# Patient Record
Sex: FEMALE | Race: WHITE | NOT HISPANIC OR LATINO | Employment: OTHER | URBAN - METROPOLITAN AREA
[De-identification: names, ages, dates, MRNs, and addresses within clinical notes are randomized per-mention and may not be internally consistent; named-entity substitution may affect disease eponyms.]

---

## 2017-12-13 ENCOUNTER — OFFICE VISIT (OUTPATIENT)
Dept: CARDIOLOGY | Facility: CLINIC | Age: 42
End: 2017-12-13

## 2017-12-13 VITALS
SYSTOLIC BLOOD PRESSURE: 128 MMHG | DIASTOLIC BLOOD PRESSURE: 74 MMHG | HEIGHT: 64 IN | BODY MASS INDEX: 30.66 KG/M2 | WEIGHT: 179.6 LBS | HEART RATE: 58 BPM

## 2017-12-13 DIAGNOSIS — I10 ESSENTIAL HYPERTENSION: ICD-10-CM

## 2017-12-13 DIAGNOSIS — R00.0 INAPPROPRIATE SINUS TACHYCARDIA: Primary | ICD-10-CM

## 2017-12-13 DIAGNOSIS — I25.10 CORONARY ARTERY DISEASE INVOLVING NATIVE CORONARY ARTERY OF NATIVE HEART WITHOUT ANGINA PECTORIS: ICD-10-CM

## 2017-12-13 PROCEDURE — 99213 OFFICE O/P EST LOW 20 MIN: CPT | Performed by: INTERNAL MEDICINE

## 2017-12-13 RX ORDER — HYDROCODONE BITARTRATE AND ACETAMINOPHEN 7.5; 325 MG/1; MG/1
1 TABLET ORAL AS NEEDED
COMMUNITY
End: 2019-01-17

## 2017-12-13 RX ORDER — LEVALBUTEROL TARTRATE 45 UG/1
1-2 AEROSOL, METERED ORAL AS NEEDED
COMMUNITY
End: 2022-04-29 | Stop reason: ALTCHOICE

## 2017-12-13 RX ORDER — SODIUM CHLORIDE FOR INHALATION 0.9 %
3 VIAL, NEBULIZER (ML) INHALATION AS NEEDED
COMMUNITY
End: 2019-03-05

## 2017-12-13 RX ORDER — ESCITALOPRAM OXALATE 10 MG/1
10 TABLET ORAL DAILY
Status: ON HOLD | COMMUNITY
End: 2018-11-12 | Stop reason: ALTCHOICE

## 2017-12-13 RX ORDER — DIPHENHYDRAMINE HCL 25 MG
25 CAPSULE ORAL 2 TIMES DAILY
COMMUNITY
End: 2019-06-20 | Stop reason: DRUGHIGH

## 2017-12-13 NOTE — PROGRESS NOTES
"Petra Tai  1975  [phone]      12/13/2017    [facility] CARDIOLOGY     Morris Brito MD  [address] [address]    No chief complaint on file.      PROBLEM LIST:  1. Inappropriate sinus tachycardia x7 years:  a. Developed during pregnancy 7 years ago.  b. Multiple Holter monitors and event recordings showing only arrhythmia to be sinus tachycardia.  c. Echocardiogram, August 2002, which showed low normal LV function with an estimated EF of 45% to 50%.  d. MUGA scan, September 2002, showed normal wall motion with an EF of 57%.  e. EP study, 04/12/2004, Dr. Lehman: RFA of inappropriate sinus tachycardia with partial sinus node re-modification.  f. Status post ThermoCool radiofrequency ablation for inappropriate sinus tachycardia on 11/20/2004.  g. Event recorder and echocardiogram with Dr. Brito: Normal per patient, no data.  h. Event recorder, January 2012, with heart rate . Questionable nonsustained atrial tachycardia versus exit block.   i. Nuclear GXT, 07/25/2012: Normal study. EF 67%  j. Echocardiogram 9/28/2016: EF 63%.  Trace MR noted.  Technically limited study.  2. Myocardial infarction.  a. Apparent hospitalization in April 2013 in [place] with a heart catheterization demonstrating 80% to 90% stenosis of a coronary artery too small to stent.   b. Positive troponins, database incomplete.   3. Hypertension x7 years.  4. Hyperlipidemia.  5. Obesity.  6. History of tobacco abuse/chronic obstructive pulmonary disease with cessation 3 months ago.    Allergies  Allergies   Allergen Reactions   • Aspirin Hives     Patient says \"baby aspirin is fine but none of the other mgs\"   • Z-Wolf [Azithromycin]        Current Medications    Current Outpatient Prescriptions:   •  atorvastatin (LIPITOR) 40 MG tablet, Take 40 mg by mouth daily., Disp: , Rfl:   •  clonazePAM (KlonoPIN) 1 MG tablet, Take 1 mg by mouth as needed for seizures., Disp: , Rfl:   •  diltiazem CD " (CARDIZEM CD) 180 MG 24 hr capsule, Take 180 mg by mouth 3 (three) times a day., Disp: , Rfl:   •  diphenhydrAMINE (BENADRYL) 25 mg capsule, Take 25 mg by mouth 2 (Two) Times a Day., Disp: , Rfl:   •  escitalopram (LEXAPRO) 10 MG tablet, Take 10 mg by mouth Daily., Disp: , Rfl:   •  furosemide (LASIX) 40 MG tablet, Take 40 mg by mouth daily., Disp: , Rfl:   •  HYDROcodone-acetaminophen (NORCO) 7.5-325 MG per tablet, Take 1 tablet by mouth 2 (Two) Times a Day., Disp: , Rfl:   •  levalbuterol (XOPENEX HFA) 45 MCG/ACT inhaler, Inhale 1-2 puffs Every 4 (Four) Hours As Needed for Wheezing., Disp: , Rfl:   •  Meloxicam 10 MG capsule, Take 10 mg by mouth daily., Disp: , Rfl:   •  Metoprolol Succinate (TOPROL XL PO), Take 200 mg by mouth 2 (two) times a day., Disp: , Rfl:   •  pantoprazole (PROTONIX) 40 MG EC tablet, Take 40 mg by mouth 2 (two) times a day., Disp: , Rfl:   •  Ped Multivitamins-Fl-Iron (MULTIVITAMIN WITH FLUORIDE/IRON) 0.25-10 MG/ML solution solution, Take  by mouth Daily., Disp: , Rfl:   •  Potassium (POTASSIMIN PO), Take  by mouth daily., Disp: , Rfl:   •  sodium chloride 0.9 % nebulizer solution, Take 3 mL by nebulization As Needed for Wheezing., Disp: , Rfl:     History of Present Illness   HPI    Pt presents for follow up of IAST. Notes some episodes of tachycardia, a few times a week.  Sometimes only lasts a few minutes, sometimes an hour.  Can be at rest or with activity.  Some dizziness with her episodes.  Since the pt has seen us, pt denies any palpitations, SOB, CP, LH, and dizziness. Denies any hospitalizations, ER visits, or TIA/CVA symptoms.  She had gastric bypass surgery 1 year ago and has lost over 100lbs since then.  Overall feels well. No side effects to medications.  Blood pressures well controlled at home.    ROS:  General:  Denies fatigue, weight gain or loss  Cardiovascular:  + palpitations, denies CP, PND, syncope, near syncope, edema.  Pulmonary:  Denies MEDINA, cough, or  "wheezing    Vitals:    12/13/17 1320   BP: 128/74   BP Location: Left arm   Patient Position: Sitting   Pulse: 58   Weight: 81.5 kg (179 lb 9.6 oz)   Height: 162.6 cm (64\")       PE:  GEN: Well nourished, Well- developed  No acute distress  NECK: No adenopathy, trachea midline, neck supple, NO JVD, no thyromegaly  CARD: Regular rhythm and normal rate, S1 S2, no murmur, gallop, rub, or click  LUNGS: Clear to auscultation bilaterally, normal respiratory effort, no wheezes, rhonchi or rales.  EXTREMITIES:No gross deformities,  No clubbing, cyanosis, or edema      Diagnostic Data:  Procedures.    1. Inappropriate sinus tachycardia    2. Essential hypertension    3. Coronary artery disease involving native coronary artery of native heart without angina pectoris          Plan:  1) IAST:  - Some episodes of tachycardia but overall controlled on current medications  2) HTN  - Well controlled on current medications  - Wt loss, exercise, salt reduction  3) Coronary artery disease:  - Stable  - ECHO 9/2016 EF 63%    F/up in 12 months    Scribed for Michael Fong MD by Lito Lawson. 12/13/2017  1:46 PM     Scribed for Michael Fong MD by Rossana Crump, WOOD. 12/13/2017  1:46 PM    IMichael MD, personally performed the services described in this documentation as scribed by the above named individual in my presence, and it is both accurate and complete.  12/13/2017  1:57 PM      "

## 2018-07-20 ENCOUNTER — TELEPHONE (OUTPATIENT)
Dept: CARDIOLOGY | Facility: CLINIC | Age: 43
End: 2018-07-20

## 2018-07-27 ENCOUNTER — TELEPHONE (OUTPATIENT)
Dept: CARDIOLOGY | Facility: CLINIC | Age: 43
End: 2018-07-27

## 2018-07-27 NOTE — TELEPHONE ENCOUNTER
Patient called back about medical clearance paperwork. Awaiting patient to call back for facilities fax #.

## 2018-10-02 ENCOUNTER — OFFICE VISIT (OUTPATIENT)
Dept: CARDIOLOGY | Facility: CLINIC | Age: 43
End: 2018-10-02

## 2018-10-02 VITALS
HEIGHT: 64 IN | BODY MASS INDEX: 30.08 KG/M2 | HEART RATE: 57 BPM | DIASTOLIC BLOOD PRESSURE: 68 MMHG | SYSTOLIC BLOOD PRESSURE: 124 MMHG | WEIGHT: 176.2 LBS

## 2018-10-02 DIAGNOSIS — R07.89 OTHER CHEST PAIN: ICD-10-CM

## 2018-10-02 DIAGNOSIS — R55 NEAR SYNCOPE: ICD-10-CM

## 2018-10-02 DIAGNOSIS — R00.2 PALPITATIONS: ICD-10-CM

## 2018-10-02 DIAGNOSIS — I10 ESSENTIAL HYPERTENSION: Primary | ICD-10-CM

## 2018-10-02 PROCEDURE — 99214 OFFICE O/P EST MOD 30 MIN: CPT | Performed by: PHYSICIAN ASSISTANT

## 2018-10-02 PROCEDURE — 93000 ELECTROCARDIOGRAM COMPLETE: CPT | Performed by: PHYSICIAN ASSISTANT

## 2018-10-02 RX ORDER — FOLIC ACID 1 MG/1
1 TABLET ORAL DAILY
COMMUNITY
End: 2019-03-05

## 2018-10-02 RX ORDER — NITROGLYCERIN 0.4 MG/1
0.4 TABLET SUBLINGUAL AS NEEDED
COMMUNITY

## 2018-10-02 RX ORDER — ALBUTEROL SULFATE 2.5 MG/3ML
2.5 SOLUTION RESPIRATORY (INHALATION) AS NEEDED
COMMUNITY

## 2018-10-02 RX ORDER — CYANOCOBALAMIN/FOLIC ACID 1MG-400MCG
TABLET, SUBLINGUAL SUBLINGUAL
Status: ON HOLD | COMMUNITY
End: 2018-11-12 | Stop reason: ALTCHOICE

## 2018-10-02 RX ORDER — LOSARTAN POTASSIUM 50 MG/1
50 TABLET ORAL DAILY
COMMUNITY
End: 2021-06-29

## 2018-10-02 NOTE — PROGRESS NOTES
[facility]   OFFICE NOTE      Petra Tai  1975  PCP: Morris Brito MD    SUBJECTIVE:   Petra Tai is a 42 y.o. female seen for a follow up visit regarding the following: Dizziness, IST, HTN, and Palpitations     CC:IST    PROBLEM LIST:  1. Inappropriate sinus tachycardia:   a. Developed during pregnancy 7 years ago.  b. Multiple Holter monitors and event recordings showing only arrhythmia to be sinus tachycardia.  c. Echocardiogram, August 2002, which showed low normal LV function with an estimated EF of 45% to 50%.  d. MUGA scan, September 2002, showed normal wall motion with an EF of 57%.  e. EP study, 04/12/2004, Dr. Lehman: RFA of inappropriate sinus tachycardia with partial sinus node re-modification.  f. Status post ThermoCool radiofrequency ablation for inappropriate sinus tachycardia on 11/20/2004.  g. Event recorder and echocardiogram with Dr. Brito: Normal per patient, no data.  h. Event recorder, January 2012, with heart rate . Questionable nonsustained atrial tachycardia versus exit block.   i. Nuclear GXT, 07/25/2012: Normal study. EF 67%  j. Echocardiogram 9/28/2016: EF 63%.  Trace MR noted.  Technically limited study.  2. Myocardial infarction.  a. Apparent hospitalization in April 2013 in [place] with a heart catheterization demonstrating 80% to 90% stenosis of a coronary artery too small to stent.   b. Positive troponins, database incomplete.   3. Hypertension x7 years.  4. Hyperlipidemia.  5. Obesity.  6. + Tobacco abuse since age 17  7. Gastric sleeve 12/16 in [place], [place] (-150 pounds)      HPI:   Today I saw Mrs. Tai for follow-up regarding history of new onset chest pain, palpitations, near-syncope, hypertension and history of inappropriate sinus tachycardia.  Mrs. Tai is a pleasant 42-year-old female who's had multiple remote ablations initially with Dr. Lehman in 2004 and a repeat procedure a few months later.  She has been  "on high-dose beta blockers and calcium channel blockers to control her heart rate and palpitations.  She last had a stress test in 2012 that was normal and echocardiogram about 2 years ago that revealed normal LV function.  She reports that approximately 6 years ago she had a heart catheter revealing small vessel coronary disease after an episode of chest pain.  She reports that in the past few weeks she's had episodes of chest pain.  Her initial episode occurred when she was driving home from [employer] she had onset of chest pressure associated with some dizziness diaphoresis and some mild nausea.  The symptoms lasted a few minutes resolve on their own.  She's had 2 more episodes but these occurred while standing on her feet one while she was folding laundry another episode when she was cleaning her house.  She describes as feeling of chest pressure in the central region of her chest that she feels cool and clammy feeling and she becomes sweaty and feels that she may pass out.  She will sit down and rest and the symptoms resolve.   She denies it radiates into her neck or into her back.  She also denies any vomiting or valencia syncope events.   She reports her blood pressure has been \"okay\".  She presents her primary care provider regarding his symptoms is recommended she have further cardiac evaluation with our office.  She denies he sustained palpitations but does have occasional breakthrough episodes on her current medications.    ROS:  Review of Symptoms:  General: no recent weight loss/gain, weakness or fatigue  Skin: no rashes, lumps, or other skin changes  HEENT: + dizziness,+ lightheadedness, No vision changes  Respiratory: no cough or hemoptysis  Cardiovascular: + palpitations, and tachycardia  Gastrointestinal: no black/tarry stools or diarrhea  Urinary: no change in frequency or urgency  Peripheral Vascular: no claudication or leg cramps  Musculoskeletal: no muscle or joint pain/stiffness  Psychiatric: no " depression or excessive stress  Neurological: no sensory or motor loss, no syncope  Hematologic: no anemia, easy bruising or bleeding  Endocrine: no thyroid problems, nor heat or cold intolerance    Cardiac PMH: (Old records have been reviewed and summarized below)      Past Medical History, Past Surgical History, Family history, Social History, and Medications were all reviewed with the patient today and updated as necessary.       Current Outpatient Prescriptions:   •  albuterol (PROVENTIL) (2.5 MG/3ML) 0.083% nebulizer solution, Take 2.5 mg by nebulization As Needed for Wheezing., Disp: , Rfl:   •  clonazePAM (KlonoPIN) 1 MG tablet, Take 1 mg by mouth as needed for seizures., Disp: , Rfl:   •  Cobalamine Combinations (B-12) 1000-400 MCG sublingual tablet, Place  under the tongue Every 30 (Thirty) Days., Disp: , Rfl:   •  diltiazem CD (CARDIZEM CD) 180 MG 24 hr capsule, Take 180 mg by mouth 3 (three) times a day., Disp: , Rfl:   •  diphenhydrAMINE (BENADRYL) 25 mg capsule, Take 25 mg by mouth As Needed., Disp: , Rfl:   •  Ergocalciferol (VITAMIN D2 PO), Take 50,000 Units by mouth 1 (One) Time Per Week., Disp: , Rfl:   •  escitalopram (LEXAPRO) 10 MG tablet, Take 10 mg by mouth Daily., Disp: , Rfl:   •  folic acid (FOLVITE) 1 MG tablet, Take 1 mg by mouth Daily., Disp: , Rfl:   •  furosemide (LASIX) 40 MG tablet, Take 40 mg by mouth daily., Disp: , Rfl:   •  HYDROcodone-acetaminophen (NORCO) 7.5-325 MG per tablet, Take 1 tablet by mouth As Needed., Disp: , Rfl:   •  levalbuterol (XOPENEX HFA) 45 MCG/ACT inhaler, Inhale 1-2 puffs Every 4 (Four) Hours As Needed for Wheezing., Disp: , Rfl:   •  losartan (COZAAR) 50 MG tablet, Take 50 mg by mouth Daily., Disp: , Rfl:   •  Meloxicam 10 MG capsule, Take 15 mg by mouth Daily., Disp: , Rfl:   •  Metoprolol Succinate (TOPROL XL PO), Take 100 mg by mouth Daily., Disp: , Rfl:   •  Multiple Vitamin (MULTI-VITAMIN DAILY PO), Take  by mouth Daily., Disp: , Rfl:   •   "nitroglycerin (NITROSTAT) 0.4 MG SL tablet, Place 0.4 mg under the tongue As Needed for Chest Pain. Take no more than 3 doses in 15 minutes., Disp: , Rfl:   •  pantoprazole (PROTONIX) 40 MG EC tablet, Take 40 mg by mouth Daily., Disp: , Rfl:   •  Ped Multivitamins-Fl-Iron (MULTIVITAMIN WITH FLUORIDE/IRON) 0.25-10 MG/ML solution solution, Take  by mouth Daily., Disp: , Rfl:   •  Potassium (POTASSIMIN PO), Take 20 mEq by mouth Daily., Disp: , Rfl:   •  sodium chloride 0.9 % nebulizer solution, Take 3 mL by nebulization As Needed for Wheezing., Disp: , Rfl:       Allergies   Allergen Reactions   • Aspirin Hives     Patient says \"baby aspirin is fine but none of the other mgs\"   • Z-Wolf [Azithromycin]      Patient Active Problem List   Diagnosis   • Hypertension   • Hyperlipidemia   • Obesity   • COPD (chronic obstructive pulmonary disease) (CMS/HCC)   • Inappropriate sinus tachycardia   • Essential hypertension   • Coronary artery disease involving native coronary artery of native heart without angina pectoris   • Other chest pain   • Palpitations   • Vasovagal syncope     Past Medical History:   Diagnosis Date   • COPD (chronic obstructive pulmonary disease) (CMS/HCC)     h/o of tobacco abuse cessation 3 months ago   • Hyperlipidemia    • Hypertension     7 years   • Inappropriate sinus tachycardia    • Obesity      Past Surgical History:   Procedure Laterality Date   • GASTRIC SLEEVE LAPAROSCOPIC     • KNEE SURGERY     • OTHER SURGICAL HISTORY      ThermoCool radiofrequency ablation     Family History   Problem Relation Age of Onset   • Heart disease Mother    • Heart disease Father      Social History   Substance Use Topics   • Smoking status: Current Every Day Smoker     Packs/day: 0.50   • Smokeless tobacco: Never Used      Comment: cessation 3 months ago per OV 07/25/12   • Alcohol use No         PHYSICAL EXAM:    /68 (BP Location: Left arm, Patient Position: Sitting)   Pulse 57   Ht 162.6 cm (64\")   Wt " 79.9 kg (176 lb 3.2 oz)   BMI 30.24 kg/m²        Wt Readings from Last 5 Encounters:   10/02/18 79.9 kg (176 lb 3.2 oz)   12/13/17 81.5 kg (179 lb 9.6 oz)   08/17/16 (!) 137 kg (302 lb 12.8 oz)       BP Readings from Last 5 Encounters:   10/02/18 124/68   12/13/17 128/74   08/17/16 142/80       General appearance - Alert, well appearing, and in no distress   Mental status - Affect appropriate to mood.  Eyes - Sclerae anicteric,  ENMT - Hearing grossly normal bilaterally, Full dental extraction  Neck - Carotids upstroke normal bilaterally, no bruits, no JVD.  Resp - Few wheezes,no rales or rhonchi, symmetric air entry.  Heart - Normal rate, regular rhythm, normal S1, S2, no murmurs, rubs, clicks or gallops.  GI - Soft, nontender, nondistended, no masses or organomegaly.  Neurological - Grossly intact - normal speech, no focal findings  Musculoskeletal - No joint tenderness, deformity or swelling, no muscular tenderness noted.  Extremities - Peripheral pulses normal, no pedal edema, no clubbing or cyanosis.  Skin - Normal coloration and turgor. Multiple tatoos  Psych -  oriented to person, place, and time.    Medical problems and test results were reviewed with the patient today.     No results found for this or any previous visit (from the past 672 hour(s)).      EKG: (EKG has been independently visualized by me and summarized below)    ECG 12 Lead  Date/Time: 10/2/2018 2:56 PM  Performed by: GI DELGADO  Authorized by: GI DELGADO   Comparison: not compared with previous ECG   Previous ECG: no previous ECG available  Rhythm: sinus bradycardia  Ectopy: atrial premature contractions  Rate: bradycardic  Conduction: conduction normal  T Waves: T waves normal  Clinical impression: abnormal ECG        ASSESSMENT   1. IST:  Rare breakthrough palpitations on BB and CCB.   2. HTN: Controlled   3. CAD: Recent Chest pain with some atypical features.   4. Near syncope: Symptoms suggest Vasovagal symptoms.  Reduce  caffeine .  Increase fluids, Gatorade and Powerade. Consider holding lasix.   5. Tobacco abuse: Discussed cessation.     PLAN  · We had a long discussion with the patient regarding her symptoms.  Some of her symptoms suggest possible vasovagal component therefore I encouraged her to increase her fluid intake reduce caffeine.  We'll defer tilt table study at this time.  · In view of her chest pain she does have a coronary artery disease history would like to pursue a GXT cardiac stress test to rule out ischemia.  · Regarding palpitations and history of IST would like to pursue Zio monitor at this time to rule out significant arrhythmias, or Bradycardia that could be related to her recent symptoms.   · We discussed in detail the importance of tobacco cessationand the danger of not doing so.  · Return for follow up in 12/18 or sooner as needed.     10/2/2018  2:48 PM    Will Ann Marie NEELY

## 2018-10-19 ENCOUNTER — APPOINTMENT (OUTPATIENT)
Dept: CARDIOLOGY | Facility: HOSPITAL | Age: 43
End: 2018-10-19

## 2018-11-06 ENCOUNTER — HOSPITAL ENCOUNTER (OUTPATIENT)
Dept: CARDIOLOGY | Facility: HOSPITAL | Age: 43
Discharge: HOME | End: 2018-11-06
Admitting: PHYSICIAN ASSISTANT

## 2018-11-06 ENCOUNTER — HOSPITAL ENCOUNTER (OUTPATIENT)
Dept: CARDIOLOGY | Facility: HOSPITAL | Age: 43
Discharge: HOME | End: 2018-11-06

## 2018-11-06 VITALS
BODY MASS INDEX: 29.88 KG/M2 | SYSTOLIC BLOOD PRESSURE: 124 MMHG | WEIGHT: 175 LBS | DIASTOLIC BLOOD PRESSURE: 72 MMHG | HEART RATE: 47 BPM | HEIGHT: 64 IN

## 2018-11-06 DIAGNOSIS — I20.9 ANGINA PECTORIS (HCC): Primary | ICD-10-CM

## 2018-11-06 DIAGNOSIS — R07.89 OTHER CHEST PAIN: ICD-10-CM

## 2018-11-06 DIAGNOSIS — I10 ESSENTIAL HYPERTENSION: ICD-10-CM

## 2018-11-06 LAB
BH CV STRESS BP STAGE 1: NORMAL
BH CV STRESS BP STAGE 2: NORMAL
BH CV STRESS BP STAGE 3: NORMAL
BH CV STRESS DURATION MIN STAGE 1: 3
BH CV STRESS DURATION MIN STAGE 2: 3
BH CV STRESS DURATION MIN STAGE 3: 3
BH CV STRESS DURATION MIN STAGE 4: 1
BH CV STRESS DURATION SEC STAGE 1: 0
BH CV STRESS DURATION SEC STAGE 2: 0
BH CV STRESS DURATION SEC STAGE 3: 0
BH CV STRESS DURATION SEC STAGE 4: 11
BH CV STRESS GRADE STAGE 1: 10
BH CV STRESS GRADE STAGE 2: 12
BH CV STRESS GRADE STAGE 3: 14
BH CV STRESS GRADE STAGE 4: 16
BH CV STRESS HR STAGE 1: 100
BH CV STRESS HR STAGE 2: 118
BH CV STRESS HR STAGE 3: 142
BH CV STRESS HR STAGE 4: 153
BH CV STRESS METS STAGE 1: 5
BH CV STRESS METS STAGE 2: 7.5
BH CV STRESS METS STAGE 3: 10
BH CV STRESS METS STAGE 4: 13.5
BH CV STRESS O2 STAGE 1: 98
BH CV STRESS O2 STAGE 2: 97
BH CV STRESS O2 STAGE 3: 93
BH CV STRESS PROTOCOL 1: NORMAL
BH CV STRESS RECOVERY BP: NORMAL MMHG
BH CV STRESS RECOVERY HR: 89 BPM
BH CV STRESS SPEED STAGE 1: 1.7
BH CV STRESS SPEED STAGE 2: 2.5
BH CV STRESS SPEED STAGE 3: 3.4
BH CV STRESS SPEED STAGE 4: 4.2
BH CV STRESS STAGE 1: 1
BH CV STRESS STAGE 2: 2
BH CV STRESS STAGE 3: 3
BH CV STRESS STAGE 4: 4
LV EF NUC BP: 65 %
MAXIMAL PREDICTED HEART RATE: 178 BPM
PERCENT MAX PREDICTED HR: 85.96 %
STRESS BASELINE BP: NORMAL MMHG
STRESS BASELINE HR: 74 BPM
STRESS O2 SAT REST: 98 %
STRESS PERCENT HR: 101 %
STRESS POST ESTIMATED WORKLOAD: 10.8 METS
STRESS POST EXERCISE DUR MIN: 10 MIN
STRESS POST EXERCISE DUR SEC: 11 SEC
STRESS POST O2 SAT PEAK: 93 %
STRESS POST PEAK BP: NORMAL MMHG
STRESS POST PEAK HR: 153 BPM
STRESS TARGET HR: 151 BPM

## 2018-11-06 PROCEDURE — 78452 HT MUSCLE IMAGE SPECT MULT: CPT

## 2018-11-06 PROCEDURE — A9500 TC99M SESTAMIBI: HCPCS | Performed by: PHYSICIAN ASSISTANT

## 2018-11-06 PROCEDURE — 0 TECHNETIUM SESTAMIBI: Performed by: PHYSICIAN ASSISTANT

## 2018-11-06 PROCEDURE — 93018 CV STRESS TEST I&R ONLY: CPT | Performed by: INTERNAL MEDICINE

## 2018-11-06 PROCEDURE — 78452 HT MUSCLE IMAGE SPECT MULT: CPT | Performed by: INTERNAL MEDICINE

## 2018-11-06 PROCEDURE — 93017 CV STRESS TEST TRACING ONLY: CPT

## 2018-11-06 RX ORDER — CLOPIDOGREL BISULFATE 75 MG/1
75 TABLET ORAL DAILY
Qty: 90 TABLET | Refills: 3 | Status: ON HOLD | OUTPATIENT
Start: 2018-11-06 | End: 2018-11-12 | Stop reason: ALTCHOICE

## 2018-11-06 RX ORDER — ISOSORBIDE MONONITRATE 30 MG/1
30 TABLET, EXTENDED RELEASE ORAL DAILY
Qty: 30 TABLET | Refills: 11 | Status: ON HOLD | OUTPATIENT
Start: 2018-11-06 | End: 2018-11-12 | Stop reason: ALTCHOICE

## 2018-11-06 RX ADMIN — TECHNETIUM TC 99M SESTAMIBI 1 DOSE: 1 INJECTION INTRAVENOUS at 10:50

## 2018-11-06 RX ADMIN — TECHNETIUM TC 99M SESTAMIBI 1 DOSE: 1 INJECTION INTRAVENOUS at 12:50

## 2018-11-09 ENCOUNTER — PREP FOR SURGERY (OUTPATIENT)
Dept: OTHER | Facility: HOSPITAL | Age: 43
End: 2018-11-09

## 2018-11-09 PROBLEM — I20.9 ANGINA PECTORIS: Status: ACTIVE | Noted: 2018-11-09

## 2018-11-09 RX ORDER — NITROGLYCERIN 0.4 MG/1
0.4 TABLET SUBLINGUAL
Status: CANCELLED | OUTPATIENT
Start: 2018-11-09

## 2018-11-09 RX ORDER — SODIUM CHLORIDE 0.9 % (FLUSH) 0.9 %
3-10 SYRINGE (ML) INJECTION AS NEEDED
Status: CANCELLED | OUTPATIENT
Start: 2018-11-09

## 2018-11-09 RX ORDER — SODIUM CHLORIDE 0.9 % (FLUSH) 0.9 %
3 SYRINGE (ML) INJECTION EVERY 12 HOURS SCHEDULED
Status: CANCELLED | OUTPATIENT
Start: 2018-11-09

## 2018-11-09 RX ORDER — ACETAMINOPHEN 325 MG/1
650 TABLET ORAL EVERY 4 HOURS PRN
Status: CANCELLED | OUTPATIENT
Start: 2018-11-09

## 2018-11-12 ENCOUNTER — HOSPITAL ENCOUNTER (OUTPATIENT)
Facility: HOSPITAL | Age: 43
Discharge: HOME | End: 2018-11-13
Attending: INTERNAL MEDICINE | Admitting: INTERNAL MEDICINE

## 2018-11-12 ENCOUNTER — APPOINTMENT (OUTPATIENT)
Dept: GENERAL RADIOLOGY | Facility: HOSPITAL | Age: 43
End: 2018-11-12

## 2018-11-12 DIAGNOSIS — I20.9 ANGINA PECTORIS (HCC): ICD-10-CM

## 2018-11-12 LAB
ACT BLD: 241 SECONDS (ref 82–152)
ACT BLD: 274 SECONDS (ref 82–152)
ACT BLD: 279 SECONDS (ref 82–152)
ALBUMIN SERPL-MCNC: 4.35 G/DL (ref 3.2–4.8)
ALBUMIN/GLOB SERPL: 1.8 G/DL (ref 1.5–2.5)
ALP SERPL-CCNC: 67 U/L (ref 25–100)
ALT SERPL W P-5'-P-CCNC: 11 U/L (ref 7–40)
ANION GAP SERPL CALCULATED.3IONS-SCNC: 5 MMOL/L (ref 3–11)
ARTICHOKE IGE QN: 144 MG/DL (ref 0–130)
AST SERPL-CCNC: 15 U/L (ref 0–33)
BILIRUB SERPL-MCNC: 0.6 MG/DL (ref 0.3–1.2)
BUN BLD-MCNC: 13 MG/DL (ref 9–23)
BUN/CREAT SERPL: 23.2 (ref 7–25)
CALCIUM SPEC-SCNC: 9.1 MG/DL (ref 8.7–10.4)
CHLORIDE SERPL-SCNC: 104 MMOL/L (ref 99–109)
CHOLEST SERPL-MCNC: 194 MG/DL (ref 0–200)
CO2 SERPL-SCNC: 28 MMOL/L (ref 20–31)
CREAT BLD-MCNC: 0.56 MG/DL (ref 0.6–1.3)
DEPRECATED RDW RBC AUTO: 46.2 FL (ref 37–54)
ERYTHROCYTE [DISTWIDTH] IN BLOOD BY AUTOMATED COUNT: 13.3 % (ref 11.3–14.5)
GFR SERPL CREATININE-BSD FRML MDRD: 119 ML/MIN/1.73
GLOBULIN UR ELPH-MCNC: 2.5 GM/DL
GLUCOSE BLD-MCNC: 97 MG/DL (ref 70–100)
HBA1C MFR BLD: 5.7 % (ref 4.8–5.6)
HCT VFR BLD AUTO: 45.4 % (ref 34.5–44)
HDLC SERPL-MCNC: 58 MG/DL (ref 40–60)
HGB BLD-MCNC: 15 G/DL (ref 11.5–15.5)
MCH RBC QN AUTO: 31.1 PG (ref 27–31)
MCHC RBC AUTO-ENTMCNC: 33 G/DL (ref 32–36)
MCV RBC AUTO: 94.2 FL (ref 80–99)
PLATELET # BLD AUTO: 239 10*3/MM3 (ref 150–450)
PMV BLD AUTO: 10.8 FL (ref 6–12)
POTASSIUM BLD-SCNC: 3.7 MMOL/L (ref 3.5–5.5)
PROT SERPL-MCNC: 6.8 G/DL (ref 5.7–8.2)
RBC # BLD AUTO: 4.82 10*6/MM3 (ref 3.89–5.14)
SODIUM BLD-SCNC: 137 MMOL/L (ref 132–146)
TRIGL SERPL-MCNC: 75 MG/DL (ref 0–150)
WBC NRBC COR # BLD: 13.2 10*3/MM3 (ref 3.5–10.8)

## 2018-11-12 PROCEDURE — 93458 L HRT ARTERY/VENTRICLE ANGIO: CPT | Performed by: INTERNAL MEDICINE

## 2018-11-12 PROCEDURE — 36415 COLL VENOUS BLD VENIPUNCTURE: CPT

## 2018-11-12 PROCEDURE — 25010000002 FENTANYL CITRATE (PF) 100 MCG/2ML SOLUTION: Performed by: INTERNAL MEDICINE

## 2018-11-12 PROCEDURE — G0378 HOSPITAL OBSERVATION PER HR: HCPCS

## 2018-11-12 PROCEDURE — 85347 COAGULATION TIME ACTIVATED: CPT

## 2018-11-12 PROCEDURE — C1769 GUIDE WIRE: HCPCS | Performed by: INTERNAL MEDICINE

## 2018-11-12 PROCEDURE — C1753 CATH, INTRAVAS ULTRASOUND: HCPCS | Performed by: INTERNAL MEDICINE

## 2018-11-12 PROCEDURE — 80053 COMPREHEN METABOLIC PANEL: CPT | Performed by: PHYSICIAN ASSISTANT

## 2018-11-12 PROCEDURE — 83036 HEMOGLOBIN GLYCOSYLATED A1C: CPT | Performed by: PHYSICIAN ASSISTANT

## 2018-11-12 PROCEDURE — C1887 CATHETER, GUIDING: HCPCS | Performed by: INTERNAL MEDICINE

## 2018-11-12 PROCEDURE — 85027 COMPLETE CBC AUTOMATED: CPT | Performed by: PHYSICIAN ASSISTANT

## 2018-11-12 PROCEDURE — 93571 IV DOP VEL&/PRESS C FLO 1ST: CPT | Performed by: INTERNAL MEDICINE

## 2018-11-12 PROCEDURE — 80061 LIPID PANEL: CPT | Performed by: PHYSICIAN ASSISTANT

## 2018-11-12 PROCEDURE — 92928 PRQ TCAT PLMT NTRAC ST 1 LES: CPT | Performed by: INTERNAL MEDICINE

## 2018-11-12 PROCEDURE — S0260 H&P FOR SURGERY: HCPCS | Performed by: INTERNAL MEDICINE

## 2018-11-12 PROCEDURE — C1874 STENT, COATED/COV W/DEL SYS: HCPCS | Performed by: INTERNAL MEDICINE

## 2018-11-12 PROCEDURE — C1725 CATH, TRANSLUMIN NON-LASER: HCPCS | Performed by: INTERNAL MEDICINE

## 2018-11-12 PROCEDURE — C9600 PERC DRUG-EL COR STENT SING: HCPCS | Performed by: INTERNAL MEDICINE

## 2018-11-12 PROCEDURE — 25010000002 MIDAZOLAM PER 1 MG: Performed by: INTERNAL MEDICINE

## 2018-11-12 PROCEDURE — C1894 INTRO/SHEATH, NON-LASER: HCPCS | Performed by: INTERNAL MEDICINE

## 2018-11-12 PROCEDURE — 25010000002 ADENOSINE (DIAGNOSTIC) PER 30 MG: Performed by: INTERNAL MEDICINE

## 2018-11-12 PROCEDURE — 25010000002 HEPARIN (PORCINE) PER 1000 UNITS: Performed by: INTERNAL MEDICINE

## 2018-11-12 PROCEDURE — 0 IOPAMIDOL PER 1 ML: Performed by: INTERNAL MEDICINE

## 2018-11-12 PROCEDURE — 92978 ENDOLUMINL IVUS OCT C 1ST: CPT | Performed by: INTERNAL MEDICINE

## 2018-11-12 PROCEDURE — 93005 ELECTROCARDIOGRAM TRACING: CPT | Performed by: PHYSICIAN ASSISTANT

## 2018-11-12 PROCEDURE — 71046 X-RAY EXAM CHEST 2 VIEWS: CPT

## 2018-11-12 PROCEDURE — C1760 CLOSURE DEV, VASC: HCPCS | Performed by: INTERNAL MEDICINE

## 2018-11-12 DEVICE — XIENCE SIERRA™ EVEROLIMUS ELUTING CORONARY STENT SYSTEM 3.00 MM X 38 MM / RAPID-EXCHANGE
Type: IMPLANTABLE DEVICE | Site: CORONARY | Status: FUNCTIONAL
Brand: XIENCE SIERRA™

## 2018-11-12 DEVICE — XIENCE SIERRA™ EVEROLIMUS ELUTING CORONARY STENT SYSTEM 3.00 MM X 33 MM / RAPID-EXCHANGE
Type: IMPLANTABLE DEVICE | Site: CORONARY | Status: FUNCTIONAL
Brand: XIENCE SIERRA™

## 2018-11-12 RX ORDER — SODIUM CHLORIDE 0.9 % (FLUSH) 0.9 %
3 SYRINGE (ML) INJECTION EVERY 12 HOURS SCHEDULED
Status: DISCONTINUED | OUTPATIENT
Start: 2018-11-12 | End: 2018-11-12 | Stop reason: HOSPADM

## 2018-11-12 RX ORDER — HEPARIN SODIUM 1000 [USP'U]/ML
INJECTION, SOLUTION INTRAVENOUS; SUBCUTANEOUS AS NEEDED
Status: DISCONTINUED | OUTPATIENT
Start: 2018-11-12 | End: 2018-11-12 | Stop reason: HOSPADM

## 2018-11-12 RX ORDER — ACETAMINOPHEN 325 MG/1
650 TABLET ORAL EVERY 4 HOURS PRN
Status: DISCONTINUED | OUTPATIENT
Start: 2018-11-12 | End: 2018-11-12 | Stop reason: HOSPADM

## 2018-11-12 RX ORDER — FENTANYL CITRATE 50 UG/ML
INJECTION, SOLUTION INTRAMUSCULAR; INTRAVENOUS AS NEEDED
Status: DISCONTINUED | OUTPATIENT
Start: 2018-11-12 | End: 2018-11-12 | Stop reason: HOSPADM

## 2018-11-12 RX ORDER — NITROGLYCERIN 0.4 MG/1
0.4 TABLET SUBLINGUAL
Status: DISCONTINUED | OUTPATIENT
Start: 2018-11-12 | End: 2018-11-12 | Stop reason: HOSPADM

## 2018-11-12 RX ORDER — CLONAZEPAM 1 MG/1
1 TABLET ORAL AS NEEDED
Status: DISCONTINUED | OUTPATIENT
Start: 2018-11-12 | End: 2018-11-13 | Stop reason: HOSPADM

## 2018-11-12 RX ORDER — METOPROLOL TARTRATE 50 MG/1
100 TABLET, FILM COATED ORAL EVERY 12 HOURS SCHEDULED
Status: DISCONTINUED | OUTPATIENT
Start: 2018-11-12 | End: 2018-11-13 | Stop reason: HOSPADM

## 2018-11-12 RX ORDER — CLOPIDOGREL BISULFATE 75 MG/1
600 TABLET ORAL ONCE
Status: COMPLETED | OUTPATIENT
Start: 2018-11-12 | End: 2018-11-12

## 2018-11-12 RX ORDER — CLOPIDOGREL BISULFATE 75 MG/1
75 TABLET ORAL DAILY
Status: DISCONTINUED | OUTPATIENT
Start: 2018-11-13 | End: 2018-11-13 | Stop reason: HOSPADM

## 2018-11-12 RX ORDER — PANTOPRAZOLE SODIUM 40 MG/1
40 TABLET, DELAYED RELEASE ORAL
Status: DISCONTINUED | OUTPATIENT
Start: 2018-11-13 | End: 2018-11-13 | Stop reason: HOSPADM

## 2018-11-12 RX ORDER — LIDOCAINE HYDROCHLORIDE 10 MG/ML
INJECTION, SOLUTION EPIDURAL; INFILTRATION; INTRACAUDAL; PERINEURAL AS NEEDED
Status: DISCONTINUED | OUTPATIENT
Start: 2018-11-12 | End: 2018-11-12 | Stop reason: HOSPADM

## 2018-11-12 RX ORDER — ACETAMINOPHEN 325 MG/1
650 TABLET ORAL EVERY 4 HOURS PRN
Status: DISCONTINUED | OUTPATIENT
Start: 2018-11-12 | End: 2018-11-13 | Stop reason: HOSPADM

## 2018-11-12 RX ORDER — LOSARTAN POTASSIUM 50 MG/1
50 TABLET ORAL DAILY
Status: DISCONTINUED | OUTPATIENT
Start: 2018-11-12 | End: 2018-11-13 | Stop reason: HOSPADM

## 2018-11-12 RX ORDER — SODIUM CHLORIDE 9 MG/ML
1-3 INJECTION, SOLUTION INTRAVENOUS CONTINUOUS
Status: DISCONTINUED | OUTPATIENT
Start: 2018-11-12 | End: 2018-11-13 | Stop reason: HOSPADM

## 2018-11-12 RX ORDER — SODIUM CHLORIDE 0.9 % (FLUSH) 0.9 %
3-10 SYRINGE (ML) INJECTION AS NEEDED
Status: DISCONTINUED | OUTPATIENT
Start: 2018-11-12 | End: 2018-11-12 | Stop reason: HOSPADM

## 2018-11-12 RX ORDER — MIDAZOLAM HYDROCHLORIDE 1 MG/ML
INJECTION INTRAMUSCULAR; INTRAVENOUS AS NEEDED
Status: DISCONTINUED | OUTPATIENT
Start: 2018-11-12 | End: 2018-11-12 | Stop reason: HOSPADM

## 2018-11-12 RX ORDER — NITROGLYCERIN 0.4 MG/1
0.4 TABLET SUBLINGUAL AS NEEDED
Status: DISCONTINUED | OUTPATIENT
Start: 2018-11-12 | End: 2018-11-13 | Stop reason: HOSPADM

## 2018-11-12 RX ORDER — METOPROLOL TARTRATE 100 MG/1
50 TABLET ORAL 2 TIMES DAILY
COMMUNITY

## 2018-11-12 RX ADMIN — ADENOSINE 78 MG: 3 INJECTION, SOLUTION INTRAVENOUS at 14:04

## 2018-11-12 RX ADMIN — SODIUM CHLORIDE 3 ML/KG/HR: 9 INJECTION, SOLUTION INTRAVENOUS at 08:45

## 2018-11-12 RX ADMIN — CLOPIDOGREL BISULFATE 600 MG: 75 TABLET ORAL at 09:01

## 2018-11-12 NOTE — H&P
Pre-cardiac Catheterization History and Physical  [facility] at [facility]      Patient:  Petra Tai  :  1975  MRN: [id number]    PCP:  Morris Brito MD  PHONE:  [phone]    DATE: 2018  ID: Petra Tai is a 43 y.o. female resident of [place], [place]   EP: MARI Fong MD    CC: Chest pain and abnormal stress test    PROBLEM LIST:   1. Inappropriate sinus tachycardia:   a. Developed during pregnancy 7 years ago.  b. Multiple Holter monitors and event recordings showing only arrhythmia to be sinus tachycardia.  c. EP study, 2004, Dr. Lehman: RFA of inappropriate sinus tachycardia with partial sinus node re-modification.  d. Status post ThermoCool radiofrequency ablation for inappropriate sinus tachycardia on 2004.  e. Echocardiogram 2016: EF 63%.  Trace MR noted.  Technically limited study.  f. ZIO monitor 2018: NSR, NSVT (4 beats), NSAT (longes 13 sec.)  2. Myocardial infarction.  a. Apparent hospitalization in 2013 in [place] with a heart catheterization demonstrating 80% to 90% stenosis of a coronary artery too small to stent. (IDB)  b. Nuclear GXT, 2012: Normal study. EF 67%  c. Recent onset chest pain, 2018  d. GXT MPS 2018: moderate sized fixed mid anterior defect with no reversibility, LVEF 65%, abnormal baseline EKG with ST depression and downsloping noted during recovery; symptoms of chest tightness with exercise   e. NSVT on ZIO monitor 2018   3. Hypertension  4. Hyperlipidemia.  5. Obesity.  6. + Tobacco abuse since age 17  7. Gastric sleeve  in [place], [place] (-150 pounds)   8. Surgical history:  a. Gastric sleeve  b. Partial hysterectomy  c. R knee meniscus repair     BRIEF HPI: Mrs. Tai is a pleasant 42 y/O WF with history of sinus tachycardia, HTN, HLD and lifetime tobacco abuse,as well as small vessel coronary artery disease by remote catheterization. She was recently seen in our EP clinic for  "follow up and reported new onset chest pain and diaphoresis concerning for angina. Stress MPS was obtained which was abnormal as noted above and she presents for cardiac catheterization today. She notes she has had a total of 5-6 episodes of these, and they occur at rest or with exertion. She reports a \"mild heart attack\" in 2012 which was treated at [place]. Catheterization at that time revealed small vessel disease only. She continues to smoke about 1/2 PPD, no alcohol or drug use. She is relatively active and does her own housework. No history of CVA, DVT/PE or rheumatic fever.     Cardiac Risk Factors: dyslipidemia, hypertension, sedentary lifestyle and smoking/ tobacco exposure    Allergies:      Allergies   Allergen Reactions   • Aspirin Hives     Patient says \"baby aspirin is fine but none of the other mgs\"   • Z-Wolf [Azithromycin]        MEDICATIONS:  No current facility-administered medications on file prior to encounter.      Current Outpatient Medications on File Prior to Encounter   Medication Sig   • clonazePAM (KlonoPIN) 1 MG tablet Take 1 mg by mouth as needed for seizures.   • diltiazem CD (CARDIZEM CD) 180 MG 24 hr capsule Take 180 mg by mouth 4 (Four) Times a Day.   • diphenhydrAMINE (BENADRYL) 25 mg capsule Take 25 mg by mouth As Needed.   • Ergocalciferol (VITAMIN D2 PO) Take 50,000 Units by mouth 1 (One) Time Per Week.   • folic acid (FOLVITE) 1 MG tablet Take 1 mg by mouth Daily.   • furosemide (LASIX) 40 MG tablet Take 40 mg by mouth daily.   • HYDROcodone-acetaminophen (NORCO) 7.5-325 MG per tablet Take 1 tablet by mouth As Needed.   • levalbuterol (XOPENEX HFA) 45 MCG/ACT inhaler Inhale 1-2 puffs Every 4 (Four) Hours As Needed for Wheezing.   • losartan (COZAAR) 50 MG tablet Take 50 mg by mouth Daily.   • Meloxicam 10 MG capsule Take 15 mg by mouth Daily.   • metoprolol tartrate (LOPRESSOR) 100 MG tablet Take 100 mg by mouth 2 (Two) Times a Day.   • nitroglycerin (NITROSTAT) 0.4 MG SL tablet " "Place 0.4 mg under the tongue As Needed for Chest Pain. Take no more than 3 doses in 15 minutes.   • pantoprazole (PROTONIX) 40 MG EC tablet Take 40 mg by mouth Daily.   • Potassium (POTASSIMIN PO) Take 20 mEq by mouth Daily.   • sodium chloride 0.9 % nebulizer solution Take 3 mL by nebulization As Needed for Wheezing.   • albuterol (PROVENTIL) (2.5 MG/3ML) 0.083% nebulizer solution Take 2.5 mg by nebulization As Needed for Wheezing.   • Cyanocobalamin 1000 MCG/ML kit Inject  as directed.     Past medical & surgical history, social and family history reviewed in the electronic medical record.    ROS: Pertinent positives listed in the HPI and problem list above. All others reviewed and negative.     Physical Exam:   /74 (BP Location: Right arm, Patient Position: Lying)   Pulse 52   Temp 97.2 °F (36.2 °C) (Temporal)   Resp 18   Ht 162.6 cm (64.02\")   Wt 79.4 kg (175 lb 0.7 oz)   SpO2 100%   BMI 30.03 kg/m²     Constitutional:    Alert, cooperative, in no acute distress   Neck:     No Jugular venous distention, adenopathy, or thyromegaly noted. There are no carotid bruits.    Heart:    Regular rhythm and normal rate, normal S1 and S2, no murmurs,gallops, rubs, or clicks. No distinct PMI noted.    Lungs:     Clear to auscultation bilaterally, respirations regular, even     and unlabored    Abdomen:     Soft non-tender, non-distended, normal bowel sounds, no masses or organomegaly   Extremities:   No gross deformities, no edema, clubbing, or cyanosis.    Pulses:   Peripheral pulses palpable and equal bilaterally.     Barbaeu Test:  Left: Normal  (oxymetric Allens) Right: Not Assessed     Labs and Diagnostic Data:  Results from last 7 days   Lab Units  11/12/18   0748   SODIUM mmol/L  137   POTASSIUM mmol/L  3.7   CHLORIDE mmol/L  104   CO2 mmol/L  28.0   BUN mg/dL  13   CREATININE mg/dL  0.56*   GLUCOSE mg/dL  97   CALCIUM mg/dL  9.1     Results from last 7 days   Lab Units  11/12/18   0748   WBC 10*3/mm3  " 13.20*   HEMOGLOBIN g/dL  15.0   HEMATOCRIT %  45.4*   PLATELETS 10*3/mm3  239     Lab Results   Component Value Date    CHOL 194 11/12/2018    TRIG 75 11/12/2018    HDL 58 11/12/2018     (H) 11/12/2018    AST 15 11/12/2018    ALT 11 11/12/2018     Results from last 7 days   Lab Units  11/12/18   0748   HEMOGLOBIN A1C %  5.70*             Stress MPS 11/6/2018:  Interpretation Summary     · Abnormal Exercise cardiolite with moderate sized fixed mid anterior defect with no reversibility.  · Left ventricular ejection fraction is normal (Calculated EF = 65%).  · Abn. baseline EKG, ST depression and downsloping noted in recovery  · Pt. c/o chest tightness rated a 7/10 at peak exercise, 8 minutes into recoevery tightness decreased to 3/10  · Expected exercise duration = 8:50 Actual = 10:11 ALFONZO - ( - 10)     CXR 11/12/2018:    FINDINGS: Cardiac size is within normal limits. No focal opacification  or consolidation. No pneumothorax or significant pleural effusion.  Degenerative changes of the spine.         IMPRESSION:  No acute cardiopulmonary process.    EKG: SR with sinus arrhythmia, poor anterior R wave progression     IMPRESSION:  · 42 y/o WF smoker with history of HTN and dyslipidemia as well as small vessel CAD by cath in 2012, with recent symptoms concerning for angina. Stress MPS was abnormal as noted above, and she presents for cardiac catheterization with intervention standby.   · She is allergic to aspirin, and will receive Plavix 600mg today    PLAN:  · Procedure to perform: LHC +/- CBI. Risks, benefits and alternatives to the procedure explained to the patient and she understands and wishes to proceed.     I, Ezio Awad MD, personally performed the services described as documented by the above named individual. I have made any necessary edits and it is both accurate and complete 11/12/2018  2:52 PM    Electronically signed by Alisia Baron PA-C, 11/12/18, 9:03 AM.

## 2018-11-13 VITALS
TEMPERATURE: 99 F | RESPIRATION RATE: 14 BRPM | DIASTOLIC BLOOD PRESSURE: 68 MMHG | OXYGEN SATURATION: 96 % | HEIGHT: 64 IN | SYSTOLIC BLOOD PRESSURE: 109 MMHG | WEIGHT: 175.04 LBS | BODY MASS INDEX: 29.88 KG/M2 | HEART RATE: 51 BPM

## 2018-11-13 PROCEDURE — 99213 OFFICE O/P EST LOW 20 MIN: CPT | Performed by: PHYSICIAN ASSISTANT

## 2018-11-13 PROCEDURE — 63710000001 PANTOPRAZOLE 40 MG TABLET DELAYED-RELEASE: Performed by: INTERNAL MEDICINE

## 2018-11-13 PROCEDURE — A9270 NON-COVERED ITEM OR SERVICE: HCPCS | Performed by: INTERNAL MEDICINE

## 2018-11-13 PROCEDURE — 63710000001 CLOPIDOGREL 75 MG TABLET: Performed by: INTERNAL MEDICINE

## 2018-11-13 PROCEDURE — G0378 HOSPITAL OBSERVATION PER HR: HCPCS

## 2018-11-13 RX ORDER — CLOPIDOGREL BISULFATE 75 MG/1
75 TABLET ORAL DAILY
Qty: 90 TABLET | Refills: 3 | Status: SHIPPED | OUTPATIENT
Start: 2018-11-13 | End: 2019-09-25 | Stop reason: SDUPTHER

## 2018-11-13 RX ORDER — ATORVASTATIN CALCIUM 80 MG/1
80 TABLET, FILM COATED ORAL NIGHTLY
Qty: 30 TABLET | Refills: 11 | Status: SHIPPED | OUTPATIENT
Start: 2018-11-13

## 2018-11-13 RX ADMIN — PANTOPRAZOLE SODIUM 40 MG: 40 TABLET, DELAYED RELEASE ORAL at 05:47

## 2018-11-13 RX ADMIN — CLOPIDOGREL BISULFATE 75 MG: 75 TABLET ORAL at 10:10

## 2018-11-13 NOTE — PROGRESS NOTES
Pt. Referred for Phase II Cardiac Rehab. Staff discussed benefits of exercise, program protocol, and educational material provided. Teach back verified.  Permission granted from patient for staff to fax referral information to outlying program at this time.  Staff to fax referral info to [facility].

## 2018-11-13 NOTE — PROGRESS NOTES
"  [facility] at [facility]  PROGRESS NOTE    Date of Admission: 11/12/2018  Length of Stay: 0  Primary Care Physician: Morris Brito MD    Chief Complaint: f/u CAD/unstable angina   Problem List:   1. Inappropriate sinus tachycardia:   a. Developed during pregnancy 7 years ago.  b. Multiple Holter monitors and event recordings showing only arrhythmia to be sinus tachycardia.  c. EP study, 04/12/2004, Dr. Lehman: RFA of inappropriate sinus tachycardia with partial sinus node re-modification.  d. Status post ThermoCool radiofrequency ablation for inappropriate sinus tachycardia on 11/20/2004.  e. Echocardiogram 9/28/2016: EF 63%.  Trace MR noted.  Technically limited study.  f. ZIO monitor October 2018: NSR, NSVT (4 beats), NSAT (longes 13 sec.)  2. Coronary artery disease   a. Apparent \"small MI\" in April 2013 in [place] with a heart catheterization demonstrating 80% to 90% stenosis of a coronary artery too small to stent. (IDB)  b. Nuclear GXT, 07/25/2012: Normal study. EF 67%  c. Unstable angina, Fall 2018  d. GXT MPS 11/6/2018: moderate sized fixed mid anterior defect with no reversibility, LVEF 65%, abnormal baseline EKG with ST depression and downsloping noted during recovery; symptoms of chest tightness with exercise   e. NSVT on ZIO monitor October 2018   f. LHC 11/12/2018: Normal LV gram, 60% ramus stenosis with acceptable FFR and deferral of intervention; severe proximal to midsegment disease in dominant RCA treated with OCT guided stenting under the Dye Vert OCT procotol   3. Hypertension  4. Hyperlipidemia.  5. Obesity.  6. + Tobacco abuse since age 17  7. Gastric sleeve 12/16 in [place], [place] (-150 pounds)   8. Surgical history:  a. Gastric sleeve  b. Partial hysterectomy  c. R knee meniscus repair           Subjective      Patient asymptomatic overnight, denies angina, dyspnea. Has ambulated without concerns.       Objective   Vitals: /68   Pulse 51   Temp 99 °F (37.2 °C) " "(Temporal)   Resp 14   Ht 162.6 cm (64.02\")   Wt 79.4 kg (175 lb 0.7 oz)   SpO2 96%   BMI 30.03 kg/m²     Physical Exam:  GENERAL: Alert, cooperative, in no acute distress.   HEENT: Normocephalic, no jugular venous distention  HEART: No discrete PMI is noted. Regular rhythm, normal rate, and no murmurs, gallops, or rubs.   LUNGS: Clear to auscultation bilaterally. No wheezing, rales or rhonchi.  ABDOMEN: Soft, bowel sounds present, non-tender   NEUROLOGIC: No focal abnormalities involving strength or sensation are noted.   EXTREMITIES: No clubbing, cyanosis, or edema noted. Sheath site stable without bleeding, bruising or hematoma     Results:  Results from last 7 days   Lab Units  11/12/18   0748   WBC 10*3/mm3  13.20*   HEMOGLOBIN g/dL  15.0   HEMATOCRIT %  45.4*   PLATELETS 10*3/mm3  239     Results from last 7 days   Lab Units  11/12/18   0748   SODIUM mmol/L  137   POTASSIUM mmol/L  3.7   CHLORIDE mmol/L  104   CO2 mmol/L  28.0   BUN mg/dL  13   CREATININE mg/dL  0.56*   GLUCOSE mg/dL  97      Lab Results   Component Value Date    CHOL 194 11/12/2018    TRIG 75 11/12/2018    HDL 58 11/12/2018     (H) 11/12/2018    AST 15 11/12/2018    ALT 11 11/12/2018     Results from last 7 days   Lab Units  11/12/18   0748   HEMOGLOBIN A1C %  5.70*     Results from last 7 days   Lab Units  11/12/18   0748   CHOLESTEROL mg/dL  194   TRIGLYCERIDES mg/dL  75   HDL CHOL mg/dL  58   LDL CHOL mg/dL  144*     No intake or output data in the 24 hours ending 11/13/18 0846    I personally reviewed the patient's EKG/Telemetry data    Radiology Data:   [facility] 11/12/2018:  Final Impression: #1 Normal left ventriculogram.     #2: 60% ramus intermedians narrowing with an acceptable FFR the deferral of intervention.     #3: Severe proximal to midsegment disease of the dominant RCA treated with OCT guided stenting under the Dye-Vert-OCT protocol this sitting.       Current Medications:    clopidogrel 75 mg Oral Daily   losartan 50 " mg Oral Daily   metoprolol tartrate 100 mg Oral Q12H   pantoprazole 40 mg Oral Q AM       sodium chloride 1-3 mL/kg/hr Last Rate: 1 mL/kg/hr (11/12/18 6671)       Assessment and Plan:   1. CAD  - [facility] with OCT guided stenting of severe proximal and midsegment disease in dominant RCA under Dye Vert protocol  - normal LV gram  - on Plavix only as patient is allergic to ASA  - will add Lipitor 80mg at discharge    2. HTN  - controlled    3. HLD  - , begin Lipitor 80mg    4. Tobacco abuse  - counseled on smoking cessation    Disposition: Patient stable and ready for discharge home with addition of Plavix 75mg daily and Lipitor 80mg nightly. She will keep her previously scheduled appointment to see Dr. Fong next month and will return to see Dr. Awad in about 3 months.     Electronically signed by Alisia Baron PA-C, 11/13/18, 1:43 PM.

## 2018-11-13 NOTE — PLAN OF CARE
Problem: Patient Care Overview  Goal: Plan of Care Review  Outcome: Ongoing (interventions implemented as appropriate)   11/13/18 6072   Coping/Psychosocial   Plan of Care Reviewed With patient;spouse   Plan of Care Review   Progress improving   OTHER   Outcome Summary VSS, NO C/O PAIN, READY FOR DC HOME.     Goal: Individualization and Mutuality  Outcome: Ongoing (interventions implemented as appropriate)    Goal: Discharge Needs Assessment  Outcome: Ongoing (interventions implemented as appropriate)    Goal: Interprofessional Rounds/Family Conf  Outcome: Ongoing (interventions implemented as appropriate)      Problem: Cardiac Catheterization (Diagnostic/Interventional) (Adult)  Goal: Signs and Symptoms of Listed Potential Problems Will be Absent, Minimized or Managed (Cardiac Catheterization)  Outcome: Ongoing (interventions implemented as appropriate)

## 2018-11-13 NOTE — PLAN OF CARE
Problem: Patient Care Overview  Goal: Plan of Care Review  Outcome: Ongoing (interventions implemented as appropriate)   11/12/18 2200   Coping/Psychosocial   Plan of Care Reviewed With patient   Plan of Care Review   Progress improving   OTHER   Outcome Summary Patient denies pain/chest pain; No signs of bleeding; VSS; Will continue to monitor     Goal: Individualization and Mutuality  Outcome: Ongoing (interventions implemented as appropriate)    Goal: Discharge Needs Assessment  Outcome: Ongoing (interventions implemented as appropriate)    Goal: Interprofessional Rounds/Family Conf  Outcome: Ongoing (interventions implemented as appropriate)      Problem: Cardiac Catheterization (Diagnostic/Interventional) (Adult)  Goal: Signs and Symptoms of Listed Potential Problems Will be Absent, Minimized or Managed (Cardiac Catheterization)  Outcome: Ongoing (interventions implemented as appropriate)    Goal: Anesthesia/Sedation Recovery  Outcome: Outcome(s) achieved Date Met: 11/12/18

## 2018-11-14 RX ORDER — DILTIAZEM HCL 90 MG
90 TABLET ORAL 4 TIMES DAILY
Start: 2018-11-14

## 2018-12-19 ENCOUNTER — OFFICE VISIT (OUTPATIENT)
Dept: CARDIOLOGY | Facility: CLINIC | Age: 43
End: 2018-12-19

## 2018-12-19 VITALS
DIASTOLIC BLOOD PRESSURE: 76 MMHG | SYSTOLIC BLOOD PRESSURE: 140 MMHG | BODY MASS INDEX: 30.05 KG/M2 | OXYGEN SATURATION: 98 % | WEIGHT: 176 LBS | HEIGHT: 64 IN | HEART RATE: 64 BPM

## 2018-12-19 DIAGNOSIS — I25.10 CORONARY ARTERY DISEASE INVOLVING NATIVE CORONARY ARTERY OF NATIVE HEART WITHOUT ANGINA PECTORIS: ICD-10-CM

## 2018-12-19 DIAGNOSIS — I10 ESSENTIAL HYPERTENSION: ICD-10-CM

## 2018-12-19 DIAGNOSIS — R55 VASOVAGAL SYNCOPE: ICD-10-CM

## 2018-12-19 DIAGNOSIS — R00.0 INAPPROPRIATE SINUS TACHYCARDIA: Primary | ICD-10-CM

## 2018-12-19 PROCEDURE — 99213 OFFICE O/P EST LOW 20 MIN: CPT | Performed by: INTERNAL MEDICINE

## 2019-01-17 ENCOUNTER — OFFICE VISIT (OUTPATIENT)
Dept: CARDIOLOGY | Facility: CLINIC | Age: 44
End: 2019-01-17

## 2019-01-17 VITALS
BODY MASS INDEX: 28.28 KG/M2 | WEIGHT: 176 LBS | DIASTOLIC BLOOD PRESSURE: 58 MMHG | OXYGEN SATURATION: 98 % | HEART RATE: 54 BPM | SYSTOLIC BLOOD PRESSURE: 108 MMHG | HEIGHT: 66 IN

## 2019-01-17 DIAGNOSIS — R00.2 PALPITATIONS: ICD-10-CM

## 2019-01-17 DIAGNOSIS — R00.0 INAPPROPRIATE SINUS TACHYCARDIA: Primary | ICD-10-CM

## 2019-01-17 PROCEDURE — 99214 OFFICE O/P EST MOD 30 MIN: CPT | Performed by: PHYSICIAN ASSISTANT

## 2019-01-17 NOTE — PROGRESS NOTES
[facility] at [facility]   OFFICE NOTE      Petra Tai  1975  PCP: Morris Brito MD    SUBJECTIVE:   Petra Tai is a 43 y.o. female seen for a follow up visit regarding the following: IST, CAD, HTN, VVS.     CC:Near syncope    PROBLEM LIST:  1. Inappropriate sinus tachycardia:   a. Developed during pregnancy 7 years ago.  b. Multiple Holter monitors and event recordings showing only arrhythmia to be sinus tachycardia.  c. Echocardiogram, August 2002, which showed low normal LV function with an estimated EF of 45% to 50%.  d. MUGA scan, September 2002, showed normal wall motion with an EF of 57%.  e. EP study, 04/12/2004, Dr. Lehman: RFA of inappropriate sinus tachycardia with partial sinus node re-modification.  f. Status post ThermoCool radiofrequency ablation for inappropriate sinus tachycardia on 11/20/2004.  g. Event recorder and echocardiogram with Dr. Brito: Normal per patient, no data.  h. Event recorder, January 2012, with heart rate . Questionable nonsustained atrial tachycardia versus exit block.   i. Nuclear GXT, 07/25/2012: Normal study. EF 67%  j. Echocardiogram 9/28/2016: EF 63%.  Trace MR noted.  Technically limited study.  k. Holter 11/18: NSR, NSAT, NSVT. No afib.   2. Myocardial infarction.  a. Apparent hospitalization in April 2013 in [place] with a heart catheterization demonstrating 80% to 90% stenosis of a coronary artery too small to stent.   b. Positive troponins, database incomplete.   c. + MPS 11/18  d. Heart cath 11/18: NL LV EF, 60% stenosis IR with acceptable FFR, PTCA/stent RCA 70% guided OCT  3. Hypertension x7 years.  4. Hyperlipidemia.  5. Obesity.  6. + Tobacco abuse since age 17  7. Gastric sleeve 12/16 in [place], [place] (-150 pounds)         HPI:   The patient is a pleasant 43-year-old female presents today for follow-up regarding history of palpitations and near-syncope.  She was recently seen by her local cardiologist which included a  follow-up echocardiogram.  On last visit with our office in November she had had complaints of chest pain and palpitations.  A stress test revealed anterior wall fixed defect and normal LV function.  She was also having significant chest pain which led to a left heart catheterization with Dr. Ezio Awad revealing significant RCA disease treated with gurgling stent.  Her ejection fraction was normal.  She states her chest pain has improved.  A monitor was placed in November revealing brief episodes of atrial tachycardia longest episode 13 seconds.  She states today that she's had an increase in these numbers palpitations.  These are associated now with near-syncope events.  She describes him as having hot flushed feeling, nausea, and diaphoresis.  She admits that sometimes these occur when she doesn't drink enough fluids and drinks [employer] coffee and smokes a lot of cigarettes.  She denies exertional chest pain.  She denies any sudden valencia syncope.  She denies any heart failure symptoms.  After her visit with her local cardiologist in repeat echocardiogram she referred to our office for further evaluation.        ROS:  Review of Symptoms:  General: no recent weight loss/gain, weakness or fatigue  Skin: no rashes, lumps, or other skin changes  HEENT: + dizziness  Respiratory: no cough or hemoptysis  Cardiovascular: + palpitations, and tachycardia  Gastrointestinal: no black/tarry stools or diarrhea  Urinary: no change in frequency or urgency  Peripheral Vascular: no claudication or leg cramps  Musculoskeletal: no muscle or joint pain/stiffness  Psychiatric: no depression or excessive stress  Neurological: no sensory or motor loss, no syncope  Hematologic: no anemia, easy bruising or bleeding  Endocrine: no thyroid problems, nor heat or cold intolerance    Cardiac PMH: (Old records have been reviewed and summarized below)      Past Medical History, Past Surgical History, Family history, Social History, and  Medications were all reviewed with the patient today and updated as necessary.       Current Outpatient Medications:   •  albuterol (PROVENTIL) (2.5 MG/3ML) 0.083% nebulizer solution, Take 2.5 mg by nebulization As Needed for Wheezing., Disp: , Rfl:   •  atorvastatin (LIPITOR) 80 MG tablet, Take 1 tablet by mouth Every Night., Disp: 30 tablet, Rfl: 11  •  clonazePAM (KlonoPIN) 1 MG tablet, Take 1 mg by mouth as needed for seizures., Disp: , Rfl:   •  clopidogrel (PLAVIX) 75 MG tablet, Take 1 tablet by mouth Daily., Disp: 90 tablet, Rfl: 3  •  diltiaZEM (CARDIZEM) 90 MG tablet, Take 1 tablet by mouth 4 (Four) Times a Day. (Patient taking differently: Take 90 mg by mouth 4 (Four) Times a Day. 2 tabs qid), Disp: , Rfl:   •  diphenhydrAMINE (BENADRYL) 25 mg capsule, Take 25 mg by mouth As Needed., Disp: , Rfl:   •  folic acid (FOLVITE) 1 MG tablet, Take 1 mg by mouth Daily., Disp: , Rfl:   •  furosemide (LASIX) 40 MG tablet, Take 40 mg by mouth daily., Disp: , Rfl:   •  levalbuterol (XOPENEX HFA) 45 MCG/ACT inhaler, Inhale 1-2 puffs As Needed for Wheezing., Disp: , Rfl:   •  losartan (COZAAR) 50 MG tablet, Take 50 mg by mouth Daily., Disp: , Rfl:   •  Meloxicam 10 MG capsule, Take 15 mg by mouth Daily., Disp: , Rfl:   •  metoprolol tartrate (LOPRESSOR) 100 MG tablet, Take 100 mg by mouth 2 (Two) Times a Day., Disp: , Rfl:   •  Multiple Vitamin (MULTI-VITAMIN DAILY PO), Take 1 tablet by mouth Daily., Disp: , Rfl:   •  nitroglycerin (NITROSTAT) 0.4 MG SL tablet, Place 0.4 mg under the tongue As Needed for Chest Pain. Take no more than 3 doses in 15 minutes., Disp: , Rfl:   •  pantoprazole (PROTONIX) 40 MG EC tablet, Take 40 mg by mouth Daily., Disp: , Rfl:   •  Potassium (POTASSIMIN PO), Take 20 mEq by mouth Daily., Disp: , Rfl:   •  sodium chloride 0.9 % nebulizer solution, Take 3 mL by nebulization As Needed for Wheezing., Disp: , Rfl:       Allergies   Allergen Reactions   • Aspirin Hives and Angioedema   • Z-Wolf  "[Azithromycin]      Patient Active Problem List   Diagnosis   • Hypertension   • Hyperlipidemia   • Obesity   • COPD (chronic obstructive pulmonary disease) (CMS/HCC)   • Inappropriate sinus tachycardia   • Essential hypertension   • Coronary artery disease involving native coronary artery of native heart without angina pectoris   • Other chest pain   • Palpitations   • Vasovagal syncope   • Angina pectoris (CMS/HCC)     Past Medical History:   Diagnosis Date   • COPD (chronic obstructive pulmonary disease) (CMS/HCC)     h/o of tobacco abuse cessation 3 months ago   • Coronary artery disease    • Hyperlipidemia    • Hypertension     7 years   • Inappropriate sinus tachycardia    • Obesity      Past Surgical History:   Procedure Laterality Date   • CARDIAC ABLATION     • CARDIAC CATHETERIZATION     • CARDIAC CATHETERIZATION N/A 11/12/2018    Procedure: Left Heart Cath;  Surgeon: Ezio Awad MD;  Location: [facility] CATH INVASIVE LOCATION;  Service: Cardiology   • CAROTID STENT     • GASTRIC SLEEVE LAPAROSCOPIC     • HYSTERECTOMY     • KNEE SURGERY     • OTHER SURGICAL HISTORY      ThermoCool radiofrequency ablation     Family History   Problem Relation Age of Onset   • Heart disease Mother    • Heart disease Father      Social History     Tobacco Use   • Smoking status: Current Every Day Smoker     Packs/day: 0.50   • Smokeless tobacco: Never Used   • Tobacco comment: cessation 3 months ago per OV 07/25/12   Substance Use Topics   • Alcohol use: No         PHYSICAL EXAM:    /58 (BP Location: Right arm, Patient Position: Sitting)   Pulse 54   Ht 167.6 cm (66\")   Wt 79.8 kg (176 lb)   SpO2 98%   BMI 28.41 kg/m²        Wt Readings from Last 5 Encounters:   01/17/19 79.8 kg (176 lb)   12/19/18 79.8 kg (176 lb)   11/12/18 79.4 kg (175 lb 0.7 oz)   11/06/18 79.4 kg (175 lb)   10/02/18 79.9 kg (176 lb 3.2 oz)       BP Readings from Last 5 Encounters:   01/17/19 108/58   12/19/18 140/76   11/13/18 109/68 "   11/06/18 124/72   10/02/18 124/68       General appearance - Alert, well appearing, and in no distress   Mental status - Affect appropriate to mood.  Eyes - Sclerae anicteric,  ENMT - Hearing grossly normal bilaterally, Dental hygiene good.  Neck - Carotids upstroke normal bilaterally, no bruits, no JVD.  Resp - Clear to auscultation, no wheezes, rales or rhonchi, symmetric air entry.  Heart - Normal rate, regular rhythm, normal S1, S2, no murmurs, rubs, clicks or gallops.  GI - Soft, nontender, nondistended, no masses or organomegaly.  Neurological - Grossly intact - normal speech, no focal findings  Musculoskeletal - No joint tenderness, deformity or swelling, no muscular tenderness noted.  Extremities - Peripheral pulses normal, no pedal edema, no clubbing or cyanosis.  Skin - Normal coloration and turgor.  Psych -  oriented to person, place, and time.    Medical problems and test results were reviewed with the patient today.     11/18 MPS    · Abnormal Exercise cardiolite with moderate sized fixed mid anterior defect with no reversibility.  · Left ventricular ejection fraction is normal (Calculated EF = 65%).  · Abn. baseline EKG, ST depression and downsloping noted in recovery  · Pt. c/o chest tightness rated a 7/10 at peak exercise, 8 minutes into recoevery tightness decreased to 3/10  · Expected exercise duration = 8:50 Actual = 10:11 ALFONZO - ( - 10)     [facility] Dr. Awad 11/12/18  Procedures Performed:  Left heart catheterization with left ventriculography and bilateral selective coronary angiography.  Physiologic interrogation of the ramus intermedians artery utilizing  FFR. OCT guided placement of overlapping (distal to proximal) 3.0 x 38 mm Xience Alona 3.0 x 33 mm Xience Alona stents covering the distal through ostial RCA with the full expansion of these stemts utilizing overlapping high pressure inflations with a 3.5 x 20 mm NC trek balloon under the DyeVert-OCT protocol.  Closure the right common femoral  artery with a 6 Portuguese VIP Angio-Seal device.       ASSESSMENT   1. IST: Palpitations. Recent Zio Monitor revealing episodes of Atrial tachycardia longest episode 13 seconds 11/18. Currently on BB, Cardizem.   2. HTN: Controlled, Labile  3. Syncope: symptoms suggest Vasovagal syncope.  Stop smoking, increase fluids, stop caffeine.   4. Tobacco abuse: Discussed Cessation.   5. CAD: LHC, CBI and Stent.  Normal EF. She denies any further Chest pain suggesting angina.  Continue DAPT.     PLAN  · Long discussion with the patient regarding monitor findings in November revealing no evidence of atrial fibrillation however brief episodes of atrial tachycardia.  The patient has been on beta blocker and Cardizem therapy with a known history of inappropriate sinus tachycardia and previous ablations in the past.  She continues to have breakthrough episodes recently in the past few weeks that he's have increased in nature.  We discussed that this is difficult to treat as her baseline heart rate is bradycardia in the 50s and titrating his medications could lead to further bradycardia.  We will consider option of an antiarrhythmic drug to prevent these breakthrough episodes.  · In addition she has episodes of near syncope which suggests possible vasovagal component with nausea, diaphoresis and weakness.  We discussed increasing fluid intake Gatorade and Powerade as well as stopping smoking and reduce caffeine intake.  · Follow-up with Dr. Ezio Awad as scheduled February and with Dr. Fong as scheduled.    1/17/2019  2:12 PM    Will Ann Marie NEELY

## 2019-03-05 ENCOUNTER — OFFICE VISIT (OUTPATIENT)
Dept: CARDIOLOGY | Facility: CLINIC | Age: 44
End: 2019-03-05

## 2019-03-05 VITALS
WEIGHT: 179 LBS | SYSTOLIC BLOOD PRESSURE: 112 MMHG | HEIGHT: 64 IN | BODY MASS INDEX: 30.56 KG/M2 | HEART RATE: 84 BPM | DIASTOLIC BLOOD PRESSURE: 70 MMHG

## 2019-03-05 DIAGNOSIS — I10 ESSENTIAL HYPERTENSION: Primary | ICD-10-CM

## 2019-03-05 DIAGNOSIS — I25.10 CORONARY ARTERY DISEASE INVOLVING NATIVE CORONARY ARTERY OF NATIVE HEART, ANGINA PRESENCE UNSPECIFIED: ICD-10-CM

## 2019-03-05 DIAGNOSIS — E78.2 MIXED HYPERLIPIDEMIA: ICD-10-CM

## 2019-03-05 PROCEDURE — 99213 OFFICE O/P EST LOW 20 MIN: CPT | Performed by: INTERNAL MEDICINE

## 2019-03-05 RX ORDER — ESCITALOPRAM OXALATE 10 MG/1
5 TABLET ORAL DAILY
COMMUNITY

## 2019-03-05 RX ORDER — HYDROCODONE BITARTRATE AND ACETAMINOPHEN 7.5; 325 MG/1; MG/1
1 TABLET ORAL AS NEEDED
COMMUNITY

## 2019-03-05 NOTE — PROGRESS NOTES
"  OFFICE FOLLOW UP     Date of Encounter:2019     Name: Petra Tai  : 1975  Address: [place] [address]  Home Phone:[phone]    PCP: Morris Brito MD  [address] [facility] [address]    Electrophysiologist: Michael Fong MD    Petra Tai is a 43 y.o. female.      Chief Complaint: Follow up of CAD, HTN, HLD    Problem List:   1. Inappropriate sinus tachycardia:   a. Developed during pregnancy 7 years ago.  b. Multiple Holter monitors and event recordings showing only arrhythmia to be sinus tachycardia.  c. EP study, 2004, Dr. Lehman: RFA of inappropriate sinus tachycardia with partial sinus node re-modification.  d. Status post ThermoCool radiofrequency ablation for inappropriate sinus tachycardia on 2004.  e. Echocardiogram 2016: EF 63%.  Trace MR noted.  Technically limited study.  f. ZIO monitor 2018: NSR, NSVT (4 beats), NSAT (longes 13 sec.)  2. Coronary artery disease   a. Apparent \"small MI\" in 2013 in [place] with a heart catheterization demonstrating 80% to 90% stenosis of a coronary artery too small to stent. (IDB)  b. Nuclear GXT, 2012: Normal study. EF 67%  c. Unstable angina, 2018  d. GXT MPS 2018: moderate sized fixed mid anterior defect with no reversibility, LVEF 65%, abnormal baseline EKG with ST depression and downsloping noted during recovery; symptoms of chest tightness with exercise   e. NSVT on ZIO monitor 2018   f. LHC 2018: Normal LV gram, 60% ramus stenosis with acceptable FFR and deferral of intervention; severe proximal to midsegment disease in dominant RCA treated with OCT guided stenting under the Dye Vert OCT procotol   3. Hypertension  4. Hyperlipidemia.  5. Obesity.  6. + Tobacco abuse since age 17  7. Gastric sleeve  in [place], [place] (-150 pounds)   8. Surgical history:  a. Gastric sleeve  b. Partial hysterectomy  c. R knee meniscus repair     Allergies:  Allergies   Allergen " Reactions   • Aspirin Hives and Angioedema   • Z-Wolf [Azithromycin]      Current Medications:  •  albuterol (PROVENTIL) (2.5 MG/3ML) 0.083%, Take 2.5 mg by nebulization As Needed for Wheezing  •  atorvastatin (LIPITOR) 80 MG tablet, Take 1 tablet by mouth Every Night.  •  clonazePAM (KlonoPIN) 1 MG tablet, Take 1 mg by mouth 3 (Three) Times a Day As Needed for Seizures.  •  clopidogrel (PLAVIX) 75 MG tablet, Take 1 tablet by mouth Daily.  •  diltiaZEM (CARDIZEM 90 mg by mouth 3 (Three) Times a Day.  •  diphenhydrAMINE (BENADRYL) 25 mg capsule, Take 25 mg by mouth 2 (Two) Times a Day.  •  escitalopram (LEXAPRO) 10 MG tablet, Take 5 mg by mouth Daily.  •  furosemide (LASIX) 40 MG tablet, Take 40 mg by mouth daily.  •  HYDROcodone-acetaminophen (NORCO) 7.5-325 MG by mouth 2 (Two) Times a Day.   •  levalbuterol (XOPENEX HFA) 45 MCG/ACT inhaler, Inhale 1-2 puffs As Needed for Wheezing.  •  losartan (COZAAR) 50 MG tablet, Take 50 mg by mouth 2 (Two) Times a Day  •  meloxicam (MOBIC) 15 MG tablet, Take 15 mg by mouth Daily  •  metoprolol tartrate (LOPRESSOR) 100 MG tablet, Take 100 mg by mouth Daily.  •  Multiple Vitamin (MULTI-VITAMIN DAILY PO), Take 1 tablet by mouth 2 (Two) Times a Day  •  nitroglycerin (NITROSTAT) 0.4 MG SL As Needed for Chest Pain.   •  pantoprazole (PROTONIX) 40 MG EC tablet, Take 40 mg by mouth Daily  •  Potassium (POTASSIMIN PO), Take 40 mEq by mouth Daily.    History of Present Illness:           Mrs. Tai returns today for scheduled follow up post intervention. She reports her chest pain has remained about the same and is intermittent. She has chest pressure with tachypalpitations as well as at rest. Her last episode was about 1 week ago. She reports sometimes her pain lasts a few minutes and other times it can last the entire day. She denies syncope, however has had pre-syncope with light-headedness. She has been compliant with her medications. She still smokes 0.5-1 PPD.     The following  "portions of the patient's history were reviewed and updated as appropriate: allergies, current medications and problem list.    ROS: Pertinent positives as listed in the HPI.  All other systems reviewed and negative.    Objective:    Vitals:    03/05/19 1504 03/05/19 1505   BP: 114/69 112/70   BP Location: Left arm Left arm   Patient Position: Sitting Standing   Pulse: 57 84   Weight: 81.2 kg (179 lb) 81.2 kg (179 lb)   Height: 162.6 cm (64\") 162.6 cm (64\")     Physical Exam:  GENERAL: Alert, cooperative, in no acute distress.   HEENT: Normocephalic, no adenopathy, no jugular venous distention  HEART: No discrete PMI is noted. Regular rhythm, normal rate, and no murmurs, gallops, or rubs.   LUNGS: Clear to auscultation bilaterally. No wheezing, rales or ronchi.  ABDOMEN: Soft, bowel sounds present, non-tender   NEUROLOGIC: No focal abnormalities involving strength or sensation are noted.   EXTREMITIES: No clubbing, cyanosis, or edema noted.     Diagnostic Data:  No new labs available to review.     Procedures      Assessment and Plan:   1.  CAD: intermittent episodes of mixed feature chest pain. We discussed doing a stress test, however she would like to wait at this time. Therefore, we have asked her to stop smoking. She will return to see us in 6 months, however she knows that if her symptoms get worse in the meantime she will call us.   2.  HTN: well controlled.   3.  HLD: Continue Lipitor 80mg nightly.     I will see Petra Tai back in 6 months or sooner on an as needed basis.    Scribed for Ezio Awad MD by Rossana Jacobson RN. 03/05/2019 3:17 PM.    I, Ezio Awad MD, [facility], [facility], personally performed the services described in this documentation as scribed by the above named individual in my presence, and it is both accurate and complete. At 6:01 PM on 03/05/2019      EMR Dragon/Transcription Disclaimer:  Much of this encounter note is an electronic transcription/translation of spoken " language to printed text.  The electronic translation of spoken language may permit erroneous, or at times, nonsensical words or phrases to be inadvertently transcribed.  Although I have reviewed the note for such errors, some may still exist.

## 2019-06-20 ENCOUNTER — OFFICE VISIT (OUTPATIENT)
Dept: CARDIOLOGY | Facility: CLINIC | Age: 44
End: 2019-06-20

## 2019-06-20 VITALS
OXYGEN SATURATION: 99 % | WEIGHT: 180 LBS | DIASTOLIC BLOOD PRESSURE: 76 MMHG | BODY MASS INDEX: 30.73 KG/M2 | HEIGHT: 64 IN | HEART RATE: 53 BPM | SYSTOLIC BLOOD PRESSURE: 130 MMHG

## 2019-06-20 DIAGNOSIS — E78.2 MIXED HYPERLIPIDEMIA: ICD-10-CM

## 2019-06-20 DIAGNOSIS — I10 ESSENTIAL HYPERTENSION: ICD-10-CM

## 2019-06-20 DIAGNOSIS — I25.10 CORONARY ARTERY DISEASE INVOLVING NATIVE CORONARY ARTERY OF NATIVE HEART WITHOUT ANGINA PECTORIS: Primary | ICD-10-CM

## 2019-06-20 PROCEDURE — 99214 OFFICE O/P EST MOD 30 MIN: CPT | Performed by: INTERNAL MEDICINE

## 2019-06-20 RX ORDER — DIPHENHYDRAMINE HCL 50 MG
50 CAPSULE ORAL 2 TIMES DAILY PRN
COMMUNITY

## 2019-06-20 RX ORDER — POTASSIUM CHLORIDE 20 MEQ/1
1 TABLET, EXTENDED RELEASE ORAL 2 TIMES DAILY
Refills: 3 | COMMUNITY
Start: 2019-06-04

## 2019-06-20 RX ORDER — EZETIMIBE 10 MG/1
10 TABLET ORAL DAILY
Qty: 30 TABLET | Refills: 11 | Status: SHIPPED | OUTPATIENT
Start: 2019-06-20 | End: 2020-06-05

## 2019-06-20 RX ORDER — ALBUTEROL SULFATE 90 UG/1
AEROSOL, METERED RESPIRATORY (INHALATION)
Refills: 2 | COMMUNITY
Start: 2019-06-04 | End: 2021-06-29

## 2019-06-20 RX ORDER — FOLIC ACID 1 MG/1
1 TABLET ORAL DAILY
COMMUNITY

## 2019-06-20 NOTE — PROGRESS NOTES
"  OFFICE FOLLOW UP     Date of Encounter:2019     Name: Petra Tai  : 1975  Address: [facility] [place] [address]    PCP: Morris Brito MD  [address] [facility] [address]    Electrophysiologist:  Michael Fong MD    Petra Tai is a 43 y.o. female.      Chief Complaint: Chest pain, follow up per PCP    Problem List:   1. Inappropriate sinus tachycardia:   a. Developed during pregnancy 7 years ago.  b. Multiple Holter monitors and event recordings showing only arrhythmia to be sinus tachycardia.  c. EP study, 2004, Dr. Lehman: RFA of inappropriate sinus tachycardia with partial sinus node re-modification.  d. Status post ThermoCool radiofrequency ablation for inappropriate sinus tachycardia on 2004.  e. Echocardiogram 2016: EF 63%.  Trace MR noted.  Technically limited study.  f. ZIO monitor 2018: NSR, NSVT (4 beats), NSAT (longes 13 sec.)  2. Coronary artery disease   a. Apparent \"small MI\" in 2013 in [place] with a heart catheterization demonstrating 80% to 90% stenosis of a coronary artery too small to stent. (IDB)  b. Nuclear GXT, 2012: Normal study. EF 67%  c. Unstable angina, 2018  d. GXT MPS 2018: moderate sized fixed mid anterior defect with no reversibility, LVEF 65%, abnormal baseline EKG with ST depression and downsloping noted during recovery; symptoms of chest tightness with exercise   e. NSVT on ZIO monitor 2018   f. LHC 2018: Normal LV gram, 60% ramus stenosis with acceptable FFR and deferral of intervention; severe proximal to midsegment disease in dominant RCA treated with OCT guided stenting under the Dye Vert OCT procotol   3. Hypertension  4. Hyperlipidemia.  5. Obesity.  6. + Tobacco abuse since age 17  7. Gastric sleeve  in [place] (-150 pounds)   8. Surgical history:  a. Gastric sleeve  b. Partial hysterectomy  c. R knee meniscus repair     Allergies:  Allergies   Allergen Reactions   • Aspirin " Hives and Angioedema   • Z-Wolf [Azithromycin]        Current Medications:  •  albuterol (PROVENTIL) (2.5 MG/3ML) 0.083% nebulizer solution, Take 2.5 mg by nebulization As Needed for Wheezing.  •  atorvastatin (LIPITOR) 80 MG tablet, Take 1 tablet by mouth Every Night.  •  clonazePAM (KlonoPIN) 1 MG tablet, Take 1 mg by mouth 3 (Three) Times a Day As Needed for Seizures  •  clopidogrel (PLAVIX) 75 MG tablet, Take 1 tablet by mouth Daily  •  diltiaZEM (CARDIZEM) 90 MG tablet, Take 1 tablet by mouth 4 (Four) Times a Day. (Patient taking differently: Take 180 mg by mouth 4 (Four) Times a Day.)  •  diphenhydrAMINE (BENADRYL) 50 MG capsule, Take 50 mg by mouth 2 (Two) Times a Day As Needed for Itching  •  escitalopram (LEXAPRO) 10 MG tablet, Take 5 mg by mouth Daily  •  folic acid (FOLVITE) 1 MG tablet, Take 1 mg by mouth Daily  •  furosemide (LASIX) 40 MG tablet, Take 40 mg by mouth daily.  •  HYDROcodone-acetaminophen (NORCO) 7.5-325 MG per tablet, Take 1 tablet by mouth 2 (Two) Times a Day  •  levalbuterol (XOPENEX HFA) 45 MCG/ACT inhaler, Inhale 1-2 puffs As Needed for Wheezing.  •  losartan (COZAAR) 50 MG tablet, Take 50 mg by mouth Daily.  •  meloxicam (MOBIC) 15 MG tablet, Take 15 mg by mouth Daily.  •  metoprolol tartrate (LOPRESSOR) 100 MG tablet, Take 100 mg by mouth Daily.   •  Multiple Vitamin (MULTI-VITAMIN DAILY PO), Take 1 tablet by mouth 2 (Two) Times a Day.   •  nitroglycerin (NITROSTAT) 0.4 MG SL tablet, Place 0.4 mg under the tongue As Needed for Chest Pain. Take no more than 3 doses in 15 minutes  •  pantoprazole (PROTONIX) 40 MG EC tablet, Take 40 mg by mouth Daily.  •  potassium chloride (K-DUR,KLOR-CON) 20 MEQ CR tablet, Take 1 tablet by mouth 2 (Two) Times a Day  •  VENTOLIN  (90 Base) MCG/ACT inhaler, USE 2 PUFFS BY MOUTH EVERY 4 HOURS AS NEEDED    History of Present Illness:       She returns today at the request of her primary care provider for chest pain. She is still smoking. She has  "had a couple of episodes of chest pressure associated with \"cold sweats\". This was relieved by SL nitroglycerin.  These were more worrisome to her PCP than to her. She has palpitations but these do not bother her. She walks on the treadmill on a regular basis. She has maintained her weight loss since gastric surgery in 2016.       The following portions of the patient's history were reviewed and updated as appropriate: allergies, current medications and problem list.    ROS: Pertinent positives as listed in the HPI.  All other systems reviewed and negative.    Objective:    Vitals:    06/20/19 1510 06/20/19 1520   BP: 121/70 130/76   BP Location: Left arm Left arm   Patient Position: Sitting Sitting   Pulse: 51 53   SpO2: 99%    Weight: 81.6 kg (180 lb)    Height: 162.6 cm (64\")      Wt Readings from Last 3 Encounters:   06/20/19 81.6 kg (180 lb)   03/05/19 81.2 kg (179 lb)   01/17/19 79.8 kg (176 lb)         Physical Exam:  GENERAL: Alert, cooperative, in no acute distress.   HEENT: Normocephalic, no adenopathy, no jugular venous distention  HEART: No discrete PMI is noted. Regular rhythm, normal rate, and no murmurs, gallops, or rubs.   LUNGS: Clear to auscultation bilaterally. No wheezing, rales or ronchi.  ABDOMEN: Soft, bowel sounds present, non-tender   NEUROLOGIC: No focal abnormalities involving strength or sensation are noted.   EXTREMITIES: No clubbing, cyanosis, or edema noted.       Diagnostic Data:    Lab Results   Component Value Date    CHOL 194 11/12/2018    TRIG 75 11/12/2018    HDL 58 11/12/2018     (H) 11/12/2018     Lab Results   Component Value Date    HGBA1C 5.70 (H) 11/12/2018     Lab Results   Component Value Date    GLUCOSE 97 11/12/2018    BUN 13 11/12/2018    CREATININE 0.56 (L) 11/12/2018    EGFRIFNONA 119 11/12/2018    BCR 23.2 11/12/2018    K 3.7 11/12/2018    CO2 28.0 11/12/2018    CALCIUM 9.1 11/12/2018    ALBUMIN 4.35 11/12/2018    AST 15 11/12/2018    ALT 11 11/12/2018    "     Procedures      Assessment and Plan:   1.  CAD: She has rare atypical chest pain at this time. She has stopped ASA due to hives. She will remain on lifelong Plavix. She needs to quit smoking and she understands this.   2.  HTN: Well controlled on current medical regimen.   3.  HLD:  , 3/15/19 (PCP labs). Will add Zetia 10 mg daily to atorvastatin 80 mg to attempt to lower LDL to target of < 70. She is agreeable.   4.  Inappropriate sinus tachycardia: continue current medications and keep scheduled follow up with Michael Fong MD.    I will see Petra Tai back in one year or sooner on an as needed basis.  Scribed for Ezio Awad MD by Rossana Jacobson RN. 6/20/2019  4:23 PM

## 2019-09-25 RX ORDER — CLOPIDOGREL BISULFATE 75 MG/1
75 TABLET ORAL DAILY
Qty: 90 TABLET | Refills: 2 | Status: SHIPPED | OUTPATIENT
Start: 2019-09-25 | End: 2020-01-29 | Stop reason: SDUPTHER

## 2020-01-29 ENCOUNTER — OFFICE VISIT (OUTPATIENT)
Dept: CARDIOLOGY | Facility: CLINIC | Age: 45
End: 2020-01-29

## 2020-01-29 VITALS
WEIGHT: 183.8 LBS | HEART RATE: 74 BPM | SYSTOLIC BLOOD PRESSURE: 110 MMHG | OXYGEN SATURATION: 97 % | DIASTOLIC BLOOD PRESSURE: 68 MMHG | HEIGHT: 64 IN | BODY MASS INDEX: 31.38 KG/M2

## 2020-01-29 DIAGNOSIS — I25.10 CORONARY ARTERY DISEASE INVOLVING NATIVE CORONARY ARTERY OF NATIVE HEART WITHOUT ANGINA PECTORIS: ICD-10-CM

## 2020-01-29 DIAGNOSIS — I10 ESSENTIAL HYPERTENSION: ICD-10-CM

## 2020-01-29 DIAGNOSIS — R00.0 INAPPROPRIATE SINUS TACHYCARDIA: Primary | ICD-10-CM

## 2020-01-29 PROCEDURE — 99213 OFFICE O/P EST LOW 20 MIN: CPT | Performed by: PHYSICIAN ASSISTANT

## 2020-01-29 RX ORDER — CLOPIDOGREL BISULFATE 75 MG/1
75 TABLET ORAL DAILY
Qty: 90 TABLET | Refills: 2 | Status: SHIPPED | OUTPATIENT
Start: 2020-01-29

## 2020-01-29 NOTE — PROGRESS NOTES
"Petra Tai  1975  [phone]    01/29/2020    [facility] CARDIOLOGY     Morris Brito MD  [address] [place] [address]    Chief Complaint   Patient presents with   • Inappropriate sinus tachycardia       Problem List:     1. Inappropriate sinus tachycardia:   a. Developed during pregnancy 7 years ago.  b. Multiple Holter monitors and event recordings showing only arrhythmia to be sinus tachycardia.  c. EP study, 04/12/2004, Dr. Lehman: RFA of inappropriate sinus tachycardia with partial sinus node re-modification.  d. Status post ThermoCool radiofrequency ablation for inappropriate sinus tachycardia on 11/20/2004.  e. Echocardiogram 9/28/2016: EF 63%.  Trace MR noted.  Technically limited study.  f. ZIO monitor October 2018: NSR, NSVT (4 beats), NSAT (longes 13 sec.)  2. Coronary artery disease   a. Apparent \"small MI\" in April 2013 in [place] with a heart catheterization demonstrating 80% to 90% stenosis of a coronary artery too small to stent. (IDB)  b. Nuclear GXT, 07/25/2012: Normal study. EF 67%  c. Unstable angina, Fall 2018  d. GXT MPS 11/6/2018: moderate sized fixed mid anterior defect with no reversibility, LVEF 65%, abnormal baseline EKG with ST depression and downsloping noted during recovery; symptoms of chest tightness with exercise   e. NSVT on ZIO monitor October 2018   f. LHC 11/12/2018: Normal LV gram, 60% ramus stenosis with acceptable FFR and deferral of intervention; severe proximal to midsegment disease in dominant RCA treated with OCT guided stenting under the Dye Vert OCT procotol   3. Hypertension  4. Hyperlipidemia.  5. Obesity.  6. + Tobacco abuse since age 17  7. Gastric sleeve 12/16 in [place] (-150 pounds)   8. Surgical history:  a. Gastric sleeve  b. Partial hysterectomy  c. R knee meniscus repair     Allergies  Allergies   Allergen Reactions   • Aspirin Hives and Angioedema   • Z-Wolf [Azithromycin] Rash       Current Medications    Current " Outpatient Medications:   •  albuterol (PROVENTIL) (2.5 MG/3ML) 0.083% nebulizer solution, Take 2.5 mg by nebulization As Needed for Wheezing., Disp: , Rfl:   •  atorvastatin (LIPITOR) 80 MG tablet, Take 1 tablet by mouth Every Night., Disp: 30 tablet, Rfl: 11  •  clonazePAM (KlonoPIN) 1 MG tablet, Take 1 mg by mouth 3 (Three) Times a Day As Needed for Seizures., Disp: , Rfl:   •  clopidogrel (PLAVIX) 75 MG tablet, Take 1 tablet by mouth Daily., Disp: 90 tablet, Rfl: 2  •  diltiaZEM (CARDIZEM) 90 MG tablet, Take 1 tablet by mouth 4 (Four) Times a Day. (Patient taking differently: Take 180 mg by mouth 3 (Three) Times a Day.), Disp: , Rfl:   •  diphenhydrAMINE (BENADRYL) 50 MG capsule, Take 50 mg by mouth 2 (Two) Times a Day As Needed for Itching., Disp: , Rfl:   •  escitalopram (LEXAPRO) 10 MG tablet, Take 5 mg by mouth Daily., Disp: , Rfl:   •  ezetimibe (ZETIA) 10 MG tablet, Take 1 tablet by mouth Daily., Disp: 30 tablet, Rfl: 11  •  folic acid (FOLVITE) 1 MG tablet, Take 1 mg by mouth Daily., Disp: , Rfl:   •  furosemide (LASIX) 40 MG tablet, Take 40 mg by mouth daily., Disp: , Rfl:   •  HYDROcodone-acetaminophen (NORCO) 7.5-325 MG per tablet, Take 1 tablet by mouth 2 (Two) Times a Day., Disp: , Rfl:   •  levalbuterol (XOPENEX HFA) 45 MCG/ACT inhaler, Inhale 1-2 puffs As Needed for Wheezing., Disp: , Rfl:   •  losartan (COZAAR) 50 MG tablet, Take 50 mg by mouth Daily., Disp: , Rfl:   •  meloxicam (MOBIC) 15 MG tablet, Take 15 mg by mouth Daily., Disp: , Rfl:   •  metoprolol tartrate (LOPRESSOR) 100 MG tablet, Take 100 mg by mouth Daily., Disp: , Rfl:   •  Multiple Vitamin (MULTI-VITAMIN DAILY PO), Take 1 tablet by mouth 2 (Two) Times a Day., Disp: , Rfl:   •  nitroglycerin (NITROSTAT) 0.4 MG SL tablet, Place 0.4 mg under the tongue As Needed for Chest Pain. Take no more than 3 doses in 15 minutes., Disp: , Rfl:   •  pantoprazole (PROTONIX) 40 MG EC tablet, Take 40 mg by mouth Daily., Disp: , Rfl:   •  potassium  "chloride (K-DUR,KLOR-CON) 20 MEQ CR tablet, Take 1 tablet by mouth 2 (Two) Times a Day., Disp: , Rfl: 3  •  VENTOLIN  (90 Base) MCG/ACT inhaler, USE 2 PUFFS BY MOUTH EVERY 4 HOURS AS NEEDED, Disp: , Rfl: 2    History of Present Illness     Pt presents for follow up of IAST, ROHANs. Since we last saw the pt, she continues have occasional palpitations, about once per week, lasting a few minutes up to an hour, not changed in duration or frequency or character than what she has had in the past.  She has chronic CP with tightness occurring rarely, sometimes relieved with CP. No new changes in her symptoms. She has no change in her mild SOB. She is still on Plavix. She is still smoking one pack per day. She has tried to stop. Her average BP is in the 130s. She walks and is going to start walking on the treadmill.     ROS:  General:  Denies fatigue, weight gain or loss  Cardiovascular:  Denies CP, PND, syncope, near syncope, edema + palpitations.  Pulmonary:  Denies MEDINA, cough, or wheezing      Vitals:    01/29/20 1423   BP: 110/68   BP Location: Left arm   Patient Position: Sitting   Pulse: 74   SpO2: 97%   Weight: 83.4 kg (183 lb 12.8 oz)   Height: 162.6 cm (64\")     Body mass index is 31.55 kg/m².  PE:  General: NAD. A & O x 3  Neck: no JVD, no carotid bruits, no TM  Heart RRR, NL S1, S2, S4 present, no rubs, murmurs  Lungs: CTA, no wheezes, rhonchi, or rales  Abd: soft, non-tender, NL BS  Ext: No musculoskeletal deformities, no edema, cyanosis, or clubbing  Psych: normal mood and affect    Diagnostic Data:      Procedures    1. Inappropriate sinus tachycardia    2. Coronary artery disease involving native coronary artery of native heart without angina pectoris    3. Essential hypertension          Plan:  1. IAST:  - doing well overall.     2. CAD:  - follows with Dr. Awad, on Plavix  - needs to quit smoking    3. HTN:  - overall controlled on current medications.     F/up in 11-12 months.    Electronically signed " by ELKIN Stewart, 01/29/20, 2:54 PM.

## 2020-06-05 RX ORDER — EZETIMIBE 10 MG/1
TABLET ORAL
Qty: 30 TABLET | Refills: 1 | Status: SHIPPED | OUTPATIENT
Start: 2020-06-05

## 2020-06-23 ENCOUNTER — OFFICE VISIT (OUTPATIENT)
Dept: CARDIOLOGY | Facility: CLINIC | Age: 45
End: 2020-06-23

## 2020-06-23 VITALS
BODY MASS INDEX: 31.99 KG/M2 | HEART RATE: 94 BPM | DIASTOLIC BLOOD PRESSURE: 77 MMHG | SYSTOLIC BLOOD PRESSURE: 103 MMHG | HEIGHT: 64 IN | WEIGHT: 187.4 LBS | OXYGEN SATURATION: 98 %

## 2020-06-23 DIAGNOSIS — E78.2 MIXED HYPERLIPIDEMIA: ICD-10-CM

## 2020-06-23 DIAGNOSIS — R00.0 INAPPROPRIATE SINUS TACHYCARDIA: Primary | ICD-10-CM

## 2020-06-23 DIAGNOSIS — I25.10 CORONARY ARTERY DISEASE INVOLVING NATIVE CORONARY ARTERY OF NATIVE HEART WITHOUT ANGINA PECTORIS: ICD-10-CM

## 2020-06-23 DIAGNOSIS — I10 ESSENTIAL HYPERTENSION: ICD-10-CM

## 2020-06-23 PROCEDURE — 99214 OFFICE O/P EST MOD 30 MIN: CPT | Performed by: INTERNAL MEDICINE

## 2020-06-23 NOTE — PROGRESS NOTES
"  OFFICE FOLLOW UP     Date of Encounter:2020     Name: Petra Tai  : 1975  Address: [facility] [address] [address]    PCP: Morris Brito MD  [address] [facility] [address]    Petra Tai is a 44 y.o. female.      Chief Complaint: Follow up of CAD, HTN, HLD    Problem List:   1. Inappropriate sinus tachycardia:   a. Developed during pregnancy 7 years ago.  b. Multiple Holter monitors and event recordings showing only arrhythmia to be sinus tachycardia.  c. EP study, 2004, Dr. Lehman: RFA of inappropriate sinus tachycardia with partial sinus node re-modification.  d. Status post ThermoCool radiofrequency ablation for inappropriate sinus tachycardia on 2004.  e. Echocardiogram 2016: EF 63%.  Trace MR noted.  Technically limited study.  f. ZIO monitor 2018: NSR, NSVT (4 beats), NSAT (longes 13 sec.)  2. Coronary artery disease   a. Apparent \"small MI\" in 2013 in [place] with a heart catheterization demonstrating 80% to 90% stenosis of a coronary artery too small to stent. (IDB)  b. Nuclear GXT, 2012: Normal study. EF 67%  c. Unstable angina, 2018  d. GXT MPS 2018: moderate sized fixed mid anterior defect with no reversibility, LVEF 65%, abnormal baseline EKG with ST depression and downsloping noted during recovery; symptoms of chest tightness with exercise   e. NSVT on ZIO monitor 2018   f. LHC 2018: Normal LV gram, 60% ramus stenosis with acceptable FFR and deferral of intervention; severe proximal to midsegment disease in dominant RCA treated with OCT guided stenting under the Dye Vert OCT procotol   3. Hypertension  4. Hyperlipidemia.  5. Obesity.  6. + Tobacco abuse since age 17  7. Gastric sleeve  in [place], [place] (-150 pounds)   8. Surgical history:  a. Gastric sleeve  b. Partial hysterectomy  c. R knee meniscus repair   Allergies:  Allergies   Allergen Reactions   • Aspirin Hives and Angioedema   • Z-Wolf [Azithromycin] Rash "       Current Medications:    Current Outpatient Medications:   •  albuterol (PROVENTIL) (2.5 MG/3ML) 0.083% nebulizer solution, Take 2.5 mg by nebulization As Needed for Wheezing., Disp: , Rfl:   •  atorvastatin (LIPITOR) 80 MG tablet, Take 1 tablet by mouth Every Night., Disp: 30 tablet, Rfl: 11  •  clonazePAM (KlonoPIN) 1 MG tablet, Take 1 mg by mouth 3 (Three) Times a Day As Needed for Seizures., Disp: , Rfl:   •  clopidogrel (PLAVIX) 75 MG tablet, Take 1 tablet by mouth Daily., Disp: 90 tablet, Rfl: 2  •  diltiaZEM (CARDIZEM) 90 MG tablet, Take 1 tablet by mouth 4 (Four) Times a Day. (Patient taking differently: Take 180 mg by mouth 3 (Three) Times a Day.), Disp: , Rfl:   •  diphenhydrAMINE (BENADRYL) 50 MG capsule, Take 50 mg by mouth 2 (Two) Times a Day As Needed for Itching., Disp: , Rfl:   •  escitalopram (LEXAPRO) 10 MG tablet, Take 5 mg by mouth Daily., Disp: , Rfl:   •  ezetimibe (ZETIA) 10 MG tablet, TAKE ONE TABLET BY MOUTH EVERY DAY, Disp: 30 tablet, Rfl: 1  •  folic acid (FOLVITE) 1 MG tablet, Take 1 mg by mouth Daily., Disp: , Rfl:   •  furosemide (LASIX) 40 MG tablet, Take 40 mg by mouth daily., Disp: , Rfl:   •  HYDROcodone-acetaminophen (NORCO) 7.5-325 MG per tablet, Take 1 tablet by mouth As Needed., Disp: , Rfl:   •  levalbuterol (XOPENEX HFA) 45 MCG/ACT inhaler, Inhale 1-2 puffs As Needed for Wheezing., Disp: , Rfl:   •  losartan (COZAAR) 50 MG tablet, Take 50 mg by mouth Daily., Disp: , Rfl:   •  meloxicam (MOBIC) 15 MG tablet, Take 15 mg by mouth Daily., Disp: , Rfl:   •  metoprolol tartrate (LOPRESSOR) 100 MG tablet, Take 100 mg by mouth Daily., Disp: , Rfl:   •  Multiple Vitamin (MULTI-VITAMIN DAILY PO), Take 1 tablet by mouth 2 (Two) Times a Day., Disp: , Rfl:   •  nitroglycerin (NITROSTAT) 0.4 MG SL tablet, Place 0.4 mg under the tongue As Needed for Chest Pain. Take no more than 3 doses in 15 minutes., Disp: , Rfl:   •  pantoprazole (PROTONIX) 40 MG EC tablet, Take 40 mg by mouth  "Daily., Disp: , Rfl:   •  potassium chloride (K-DUR,KLOR-CON) 20 MEQ CR tablet, Take 1 tablet by mouth Daily., Disp: , Rfl: 3  •  VENTOLIN  (90 Base) MCG/ACT inhaler, USE 2 PUFFS BY MOUTH EVERY 4 HOURS AS NEEDED, Disp: , Rfl: 2    History of Present Illness: The patient returns for elective scheduled follow-up this afternoon.  She has had no symptoms related to inappropriate tachycardia.  She has had no angina.  She has had no symptoms of congestive heart failure.  Medications are as noted above.         The following portions of the patient's history were reviewed and updated as appropriate: allergies, current medications and problem list.    ROS: Pertinent positives as listed in the HPI.  All other systems reviewed and negative.    Objective:    Vitals:    06/23/20 1347 06/23/20 1349   BP: 116/65 103/77   BP Location: Right arm Right arm   Patient Position: Sitting Standing   Pulse: 77 94   SpO2: 98%    Weight: 85 kg (187 lb 6.4 oz)    Height: 162.6 cm (64\")        Physical Exam: VS as above.  HEENT: No jugular venous distention thyromegaly or adenopathy noted.  Cardiac: Clinically the heart is not enlarged.  I do not hear murmur gallop rub click or other normal sound.  Lungs: Clear to auscultation.  I do not hear any crackles or wheezes.  Abdomen: No organomegaly. No ascites.  Extremities: Peripheral pulses are intact. No edema.      Diagnostic Data:  No new labs available to review.     Procedures      Assessment and Plan:   1.  CAD:  NYHA I on medical therapy following coronary revascularization.  No further evaluation is needed at this time.  The patient needs to continue medications that include Plavix (noted aspirin allergy) and a statin indefinitely.  2.  HTN: She is at current AHA target on current medications.  Changes will not be made today.  3.  HLD: She is on high-dose atorvastatin and Zetia.  This can be followed by her primary care physician.  Should an LDL of less than approximately 70 not be " attained, we would consider her as a candidate for PCSK9 inhibition.  4.  Inappropriate ST: Continue follow up with EP clinic.     I will see Petra Tai back in one year or sooner on an as needed basis.              EMR Dragon/Transcription Disclaimer:  Much of this encounter note is an electronic transcription/translation of spoken language to printed text.  The electronic translation of spoken language may permit erroneous, or at times, nonsensical words or phrases to be inadvertently transcribed.  Although I have reviewed the note for such errors, some may still exist.

## 2021-06-29 ENCOUNTER — OFFICE VISIT (OUTPATIENT)
Dept: CARDIOLOGY | Facility: CLINIC | Age: 46
End: 2021-06-29

## 2021-06-29 VITALS
SYSTOLIC BLOOD PRESSURE: 132 MMHG | WEIGHT: 205 LBS | BODY MASS INDEX: 35 KG/M2 | OXYGEN SATURATION: 98 % | HEART RATE: 59 BPM | DIASTOLIC BLOOD PRESSURE: 74 MMHG | HEIGHT: 64 IN

## 2021-06-29 DIAGNOSIS — R00.0 INAPPROPRIATE SINUS TACHYCARDIA: Primary | ICD-10-CM

## 2021-06-29 PROCEDURE — 99213 OFFICE O/P EST LOW 20 MIN: CPT | Performed by: PHYSICIAN ASSISTANT

## 2021-06-29 NOTE — PROGRESS NOTES
"[facility]   OFFICE NOTE      Petra Tai  1975  PCP: Morris Brito MD    SUBJECTIVE:   Petra Tai is a 45 y.o. female seen for a follow up visit regarding the following:     CC:IST    HPI:   45 year old presents for follow up regarding IST, CAD, HTN and HLD. Since we last saw the pt, pt denies any sustained palpitations, SOB, CP, LH, and dizziness. Denies any hospitalizations, ER visits, bleeding, or TIA/CVA symptoms. Overall feels well.  She has not had any sustained episodes of chest pain requiring further admissions.  She is still trying to quit smoking.  She been very busy she has 2 twin grandchildren and she takes care of her immediate family.  Overall she feels she is doing well at this current time.    Cardiac PMH: (Old records have been reviewed and summarized below)  1. Inappropriate sinus tachycardia:   a. Developed during pregnancy 7 years ago.  b. Multiple Holter monitors and event recordings showing only arrhythmia to be sinus tachycardia.  c. EP study, 04/12/2004, Dr. Lehman: RFA of inappropriate sinus tachycardia with partial sinus node re-modification.  d. Status post ThermoCool radiofrequency ablation for inappropriate sinus tachycardia on 11/20/2004.  e. Echocardiogram 9/28/2016: EF 63%.  Trace MR noted.  Technically limited study.  f. ZIO monitor October 2018: NSR, NSVT (4 beats), NSAT (longes 13 sec.)  2. Coronary artery disease   a. Apparent \"small MI\" in April 2013 in [place] with a heart catheterization demonstrating 80% to 90% stenosis of a coronary artery too small to stent. (IDB)  b. Nuclear GXT, 07/25/2012: Normal study. EF 67%  c. Unstable angina, Fall 2018  d. GXT MPS 11/6/2018: moderate sized fixed mid anterior defect with no reversibility, LVEF 65%, abnormal baseline EKG with ST depression and downsloping noted during recovery; symptoms of chest tightness with exercise   e. NSVT on ZIO monitor October 2018   f. LHC 11/12/2018: " Normal LV gram, 60% ramus stenosis with acceptable FFR and deferral of intervention; severe proximal to midsegment disease in dominant RCA treated with OCT guided stenting under the Dye Vert OCT procotol   3. Hypertension  4. Hyperlipidemia.  5. Obesity.  6. + Tobacco abuse since age 17  7. Gastric sleeve 12/16 in [place], [place] (-150 pounds)   8. Surgical history:  a. Gastric sleeve  b. Partial hysterectomy  c. R knee meniscus repair        Past Medical History, Past Surgical History, Family history, Social History, and Medications were all reviewed with the patient today and updated as necessary.       Current Outpatient Medications:   •  albuterol (PROVENTIL) (2.5 MG/3ML) 0.083% nebulizer solution, Take 2.5 mg by nebulization As Needed for Wheezing., Disp: , Rfl:   •  atorvastatin (LIPITOR) 80 MG tablet, Take 1 tablet by mouth Every Night., Disp: 30 tablet, Rfl: 11  •  clonazePAM (KlonoPIN) 1 MG tablet, Take 1 mg by mouth 3 (Three) Times a Day As Needed for Seizures., Disp: , Rfl:   •  clopidogrel (PLAVIX) 75 MG tablet, Take 1 tablet by mouth Daily., Disp: 90 tablet, Rfl: 2  •  diltiaZEM (CARDIZEM) 90 MG tablet, Take 1 tablet by mouth 4 (Four) Times a Day. (Patient taking differently: Take 90 mg by mouth 4 (Four) Times a Day. 2 tablets 4 times daily), Disp: , Rfl:   •  diphenhydrAMINE (BENADRYL) 50 MG capsule, Take 50 mg by mouth 2 (Two) Times a Day As Needed for Itching., Disp: , Rfl:   •  escitalopram (LEXAPRO) 10 MG tablet, Take 5 mg by mouth Daily., Disp: , Rfl:   •  ezetimibe (ZETIA) 10 MG tablet, TAKE ONE TABLET BY MOUTH EVERY DAY, Disp: 30 tablet, Rfl: 1  •  folic acid (FOLVITE) 1 MG tablet, Take 1 mg by mouth Daily., Disp: , Rfl:   •  furosemide (LASIX) 40 MG tablet, Take 40 mg by mouth daily., Disp: , Rfl:   •  HYDROcodone-acetaminophen (NORCO) 7.5-325 MG per tablet, Take 1 tablet by mouth As Needed., Disp: , Rfl:   •  levalbuterol (XOPENEX HFA) 45 MCG/ACT inhaler, Inhale 1-2 puffs As Needed for Wheezing.,  Disp: , Rfl:   •  meloxicam (MOBIC) 15 MG tablet, Take 15 mg by mouth Daily., Disp: , Rfl:   •  metoprolol tartrate (LOPRESSOR) 100 MG tablet, Take 50 mg by mouth 2 (Two) Times a Day., Disp: , Rfl:   •  Multiple Vitamin (MULTI-VITAMIN DAILY PO), Take 1 tablet by mouth 2 (Two) Times a Day., Disp: , Rfl:   •  nitroglycerin (NITROSTAT) 0.4 MG SL tablet, Place 0.4 mg under the tongue As Needed for Chest Pain. Take no more than 3 doses in 15 minutes., Disp: , Rfl:   •  pantoprazole (PROTONIX) 40 MG EC tablet, Take 40 mg by mouth Daily., Disp: , Rfl:   •  potassium chloride (K-DUR,KLOR-CON) 20 MEQ CR tablet, Take 1 tablet by mouth 2 (Two) Times a Day., Disp: , Rfl: 3  •  VITAMIN D PO, Take 50,000 Units by mouth 1 (One) Time Per Week., Disp: , Rfl:       Allergies   Allergen Reactions   • Aspirin Hives and Angioedema   • Z-Wolf [Azithromycin] Rash     Patient Active Problem List   Diagnosis   • Hypertension   • Hyperlipidemia   • Obesity   • COPD (chronic obstructive pulmonary disease) (CMS/HCC)   • Inappropriate sinus tachycardia   • Essential hypertension   • Coronary artery disease involving native coronary artery of native heart without angina pectoris   • Other chest pain   • Palpitations   • Vasovagal syncope   • Angina pectoris (CMS/HCC)     Past Medical History:   Diagnosis Date   • COPD (chronic obstructive pulmonary disease) (CMS/HCC)     h/o of tobacco abuse cessation 3 months ago   • Coronary artery disease    • Hyperlipidemia    • Hypertension     7 years   • Inappropriate sinus tachycardia    • Obesity      Past Surgical History:   Procedure Laterality Date   • CARDIAC ABLATION     • CARDIAC CATHETERIZATION     • CARDIAC CATHETERIZATION N/A 11/12/2018    Procedure: Left Heart Cath;  Surgeon: Ezio Awad MD;  Location: [facility] CATH INVASIVE LOCATION;  Service: Cardiology   • CAROTID STENT     • GASTRIC SLEEVE LAPAROSCOPIC     • HYSTERECTOMY     • KNEE SURGERY     • OTHER SURGICAL HISTORY      ThermoCool  "radiofrequency ablation     Family History   Problem Relation Age of Onset   • Heart disease Mother    • Heart disease Father      Social History     Tobacco Use   • Smoking status: Current Every Day Smoker     Packs/day: 0.50   • Smokeless tobacco: Never Used   • Tobacco comment: cessation 3 months ago per OV 07/25/12   Substance Use Topics   • Alcohol use: No       ROS:  Review of Symptoms:  General: no recent weight loss/gain, weakness or fatigue  Skin: no rashes, lumps, or other skin changes  HEENT: no dizziness, lightheadedness, or vision changes  Respiratory: no cough or hemoptysis  Cardiovascular: no palpitations, and tachycardia  Gastrointestinal: no black/tarry stools or diarrhea  Urinary: no change in frequency or urgency  Peripheral Vascular: no claudication or leg cramps  Musculoskeletal: no muscle or joint pain/stiffness  Psychiatric: no depression or excessive stress  Neurological: no sensory or motor loss, no syncope  Hematologic: no anemia, easy bruising or bleeding  Endocrine: no thyroid problems, nor heat or cold intolerance    PHYSICAL EXAM:    /74 (BP Location: Right arm, Patient Position: Sitting)   Pulse 59   Ht 162.6 cm (64\")   Wt 93 kg (205 lb)   LMP  (LMP Unknown)   SpO2 98%   BMI 35.19 kg/m²        Wt Readings from Last 5 Encounters:   06/29/21 93 kg (205 lb)   06/23/20 85 kg (187 lb 6.4 oz)   01/29/20 83.4 kg (183 lb 12.8 oz)   06/20/19 81.6 kg (180 lb)   03/05/19 81.2 kg (179 lb)       BP Readings from Last 5 Encounters:   06/29/21 132/74   06/23/20 103/77   01/29/20 110/68   06/20/19 130/76   03/05/19 112/70       General appearance - Alert, well appearing, and in no distress   Mental status - Affect appropriate to mood.  Eyes - Sclerae anicteric,  ENMT - Hearing grossly normal bilaterally, Dental hygiene good.  Neck - Carotids upstroke normal bilaterally, no bruits, no JVD.  Resp - Clear to auscultation, no wheezes, rales or rhonchi, symmetric air entry.  Heart - Normal " rate, regular rhythm, normal S1, S2, no murmurs, rubs, clicks or gallops.  GI - Soft, nontender, nondistended, no masses or organomegaly.  Neurological - Grossly intact - normal speech, no focal findings  Musculoskeletal - No joint tenderness, deformity or swelling, no muscular tenderness noted.  Extremities - Peripheral pulses normal, no pedal edema, no clubbing or cyanosis.  Skin - Normal coloration and turgor.  Psych -  oriented to person, place, and time.    Medical problems and test results were reviewed with the patient today.     No results found for this or any previous visit (from the past 672 hour(s)).          ASSESSMENT   1. IST: well controlled on current medications  2. CAD: No angina symptoms  3. HTN: Controlled.   4. Tobacco abuse: one half per day. Discussed need for cessation.    PLAN  · Continue current medical therapy  · Discussed need for immediate tobacco cessation dangers not doing so  · Overall stable cardiovascular course, return to Dr. Awad as scheduled with EP as needed basis as her IST has been well controlled.      6/29/2021  13:56 EDT  Will Ann Marie NEELY

## 2022-04-29 ENCOUNTER — OFFICE VISIT (OUTPATIENT)
Dept: CARDIOLOGY | Facility: CLINIC | Age: 47
End: 2022-04-29

## 2022-04-29 VITALS
OXYGEN SATURATION: 98 % | HEART RATE: 101 BPM | BODY MASS INDEX: 36.37 KG/M2 | HEIGHT: 64 IN | SYSTOLIC BLOOD PRESSURE: 142 MMHG | WEIGHT: 213 LBS | DIASTOLIC BLOOD PRESSURE: 86 MMHG

## 2022-04-29 DIAGNOSIS — E78.2 MIXED HYPERLIPIDEMIA: ICD-10-CM

## 2022-04-29 DIAGNOSIS — I25.110 CORONARY ARTERY DISEASE INVOLVING NATIVE CORONARY ARTERY OF NATIVE HEART WITH UNSTABLE ANGINA PECTORIS: Primary | ICD-10-CM

## 2022-04-29 DIAGNOSIS — R00.0 INAPPROPRIATE SINUS TACHYCARDIA: ICD-10-CM

## 2022-04-29 DIAGNOSIS — I10 ESSENTIAL HYPERTENSION: ICD-10-CM

## 2022-04-29 PROCEDURE — 99214 OFFICE O/P EST MOD 30 MIN: CPT | Performed by: INTERNAL MEDICINE

## 2022-04-29 RX ORDER — LISINOPRIL AND HYDROCHLOROTHIAZIDE 12.5; 1 MG/1; MG/1
1 TABLET ORAL DAILY
COMMUNITY
Start: 2022-04-26

## 2022-04-29 RX ORDER — BUDESONIDE AND FORMOTEROL FUMARATE DIHYDRATE 160; 4.5 UG/1; UG/1
2 AEROSOL RESPIRATORY (INHALATION)
COMMUNITY

## 2022-04-29 RX ORDER — BUDESONIDE 0.25 MG/2ML
0.25 INHALANT ORAL
COMMUNITY

## 2022-05-04 ENCOUNTER — PREP FOR SURGERY (OUTPATIENT)
Dept: OTHER | Facility: HOSPITAL | Age: 47
End: 2022-05-04

## 2022-05-04 RX ORDER — SODIUM CHLORIDE 0.9 % (FLUSH) 0.9 %
10 SYRINGE (ML) INJECTION EVERY 12 HOURS SCHEDULED
Status: CANCELLED | OUTPATIENT
Start: 2022-05-04

## 2022-05-04 RX ORDER — SODIUM CHLORIDE 0.9 % (FLUSH) 0.9 %
10 SYRINGE (ML) INJECTION AS NEEDED
Status: CANCELLED | OUTPATIENT
Start: 2022-05-04

## 2022-05-04 RX ORDER — ONDANSETRON 2 MG/ML
4 INJECTION INTRAMUSCULAR; INTRAVENOUS EVERY 6 HOURS PRN
Status: CANCELLED | OUTPATIENT
Start: 2022-05-04

## 2022-05-05 ENCOUNTER — HOSPITAL ENCOUNTER (OUTPATIENT)
Facility: HOSPITAL | Age: 47
Setting detail: HOSPITAL OUTPATIENT SURGERY
Discharge: HOME | End: 2022-05-05
Attending: INTERNAL MEDICINE | Admitting: INTERNAL MEDICINE

## 2022-05-05 VITALS
BODY MASS INDEX: 36.26 KG/M2 | OXYGEN SATURATION: 95 % | HEART RATE: 59 BPM | SYSTOLIC BLOOD PRESSURE: 141 MMHG | HEIGHT: 64 IN | TEMPERATURE: 96.4 F | RESPIRATION RATE: 16 BRPM | DIASTOLIC BLOOD PRESSURE: 91 MMHG | WEIGHT: 212.4 LBS

## 2022-05-05 DIAGNOSIS — I25.110 CORONARY ARTERY DISEASE INVOLVING NATIVE CORONARY ARTERY OF NATIVE HEART WITH UNSTABLE ANGINA PECTORIS: ICD-10-CM

## 2022-05-05 DIAGNOSIS — R00.0 INAPPROPRIATE SINUS TACHYCARDIA: ICD-10-CM

## 2022-05-05 DIAGNOSIS — E78.2 MIXED HYPERLIPIDEMIA: ICD-10-CM

## 2022-05-05 DIAGNOSIS — I10 ESSENTIAL HYPERTENSION: ICD-10-CM

## 2022-05-05 LAB
ACT BLD: 333 SECONDS (ref 82–152)
ALBUMIN SERPL-MCNC: 4.2 G/DL (ref 3.5–5.2)
ALBUMIN/GLOB SERPL: 1.7 G/DL
ALP SERPL-CCNC: 88 U/L (ref 39–117)
ALT SERPL W P-5'-P-CCNC: 12 U/L (ref 1–33)
ANION GAP SERPL CALCULATED.3IONS-SCNC: 10 MMOL/L (ref 5–15)
AST SERPL-CCNC: 15 U/L (ref 1–32)
BILIRUB SERPL-MCNC: 0.4 MG/DL (ref 0–1.2)
BUN SERPL-MCNC: 17 MG/DL (ref 6–20)
BUN/CREAT SERPL: 27 (ref 7–25)
CALCIUM SPEC-SCNC: 9.3 MG/DL (ref 8.6–10.5)
CHLORIDE SERPL-SCNC: 102 MMOL/L (ref 98–107)
CHOLEST SERPL-MCNC: 145 MG/DL (ref 0–200)
CO2 SERPL-SCNC: 28 MMOL/L (ref 22–29)
CREAT BLDA-MCNC: 0.6 MG/DL (ref 0.6–1.3)
CREAT SERPL-MCNC: 0.63 MG/DL (ref 0.57–1)
DEPRECATED RDW RBC AUTO: 44.2 FL (ref 37–54)
EGFRCR SERPLBLD CKD-EPI 2021: 111.6 ML/MIN/1.73
ERYTHROCYTE [DISTWIDTH] IN BLOOD BY AUTOMATED COUNT: 12.7 % (ref 12.3–15.4)
GLOBULIN UR ELPH-MCNC: 2.5 GM/DL
GLUCOSE SERPL-MCNC: 84 MG/DL (ref 65–99)
HBA1C MFR BLD: 5.5 % (ref 4.8–5.6)
HCT VFR BLD AUTO: 44.7 % (ref 34–46.6)
HDLC SERPL-MCNC: 45 MG/DL (ref 40–60)
HGB BLD-MCNC: 15.2 G/DL (ref 12–15.9)
LDLC SERPL CALC-MCNC: 81 MG/DL (ref 0–100)
LDLC/HDLC SERPL: 1.76 {RATIO}
MCH RBC QN AUTO: 32.1 PG (ref 26.6–33)
MCHC RBC AUTO-ENTMCNC: 34 G/DL (ref 31.5–35.7)
MCV RBC AUTO: 94.3 FL (ref 79–97)
PLATELET # BLD AUTO: 254 10*3/MM3 (ref 140–450)
PMV BLD AUTO: 10.1 FL (ref 6–12)
POTASSIUM SERPL-SCNC: 4 MMOL/L (ref 3.5–5.2)
PROT SERPL-MCNC: 6.7 G/DL (ref 6–8.5)
RBC # BLD AUTO: 4.74 10*6/MM3 (ref 3.77–5.28)
SODIUM SERPL-SCNC: 140 MMOL/L (ref 136–145)
TRIGL SERPL-MCNC: 103 MG/DL (ref 0–150)
VLDLC SERPL-MCNC: 19 MG/DL (ref 5–40)
WBC NRBC COR # BLD: 9.75 10*3/MM3 (ref 3.4–10.8)

## 2022-05-05 PROCEDURE — 25010000002 MIDAZOLAM PER 1 MG: Performed by: INTERNAL MEDICINE

## 2022-05-05 PROCEDURE — C1894 INTRO/SHEATH, NON-LASER: HCPCS | Performed by: INTERNAL MEDICINE

## 2022-05-05 PROCEDURE — 93458 L HRT ARTERY/VENTRICLE ANGIO: CPT | Performed by: INTERNAL MEDICINE

## 2022-05-05 PROCEDURE — C1769 GUIDE WIRE: HCPCS | Performed by: INTERNAL MEDICINE

## 2022-05-05 PROCEDURE — 80053 COMPREHEN METABOLIC PANEL: CPT

## 2022-05-05 PROCEDURE — C9600 PERC DRUG-EL COR STENT SING: HCPCS | Performed by: INTERNAL MEDICINE

## 2022-05-05 PROCEDURE — 83036 HEMOGLOBIN GLYCOSYLATED A1C: CPT

## 2022-05-05 PROCEDURE — C1874 STENT, COATED/COV W/DEL SYS: HCPCS | Performed by: INTERNAL MEDICINE

## 2022-05-05 PROCEDURE — 80061 LIPID PANEL: CPT

## 2022-05-05 PROCEDURE — 85347 COAGULATION TIME ACTIVATED: CPT

## 2022-05-05 PROCEDURE — 92928 PRQ TCAT PLMT NTRAC ST 1 LES: CPT | Performed by: INTERNAL MEDICINE

## 2022-05-05 PROCEDURE — 85027 COMPLETE CBC AUTOMATED: CPT

## 2022-05-05 PROCEDURE — C1725 CATH, TRANSLUMIN NON-LASER: HCPCS | Performed by: INTERNAL MEDICINE

## 2022-05-05 PROCEDURE — 25010000002 HEPARIN (PORCINE) PER 1000 UNITS: Performed by: INTERNAL MEDICINE

## 2022-05-05 PROCEDURE — C1887 CATHETER, GUIDING: HCPCS | Performed by: INTERNAL MEDICINE

## 2022-05-05 PROCEDURE — 82565 ASSAY OF CREATININE: CPT

## 2022-05-05 PROCEDURE — 0 IOPAMIDOL PER 1 ML: Performed by: INTERNAL MEDICINE

## 2022-05-05 PROCEDURE — 36415 COLL VENOUS BLD VENIPUNCTURE: CPT

## 2022-05-05 DEVICE — STNT CORNRY RESOLUTE ONYX RX 2X18MM: Type: IMPLANTABLE DEVICE | Status: FUNCTIONAL

## 2022-05-05 RX ORDER — SODIUM CHLORIDE 0.9 % (FLUSH) 0.9 %
10 SYRINGE (ML) INJECTION EVERY 12 HOURS SCHEDULED
Status: DISCONTINUED | OUTPATIENT
Start: 2022-05-05 | End: 2022-05-05 | Stop reason: HOSPADM

## 2022-05-05 RX ORDER — SODIUM CHLORIDE 9 MG/ML
3 INJECTION, SOLUTION INTRAVENOUS CONTINUOUS
Status: ACTIVE | OUTPATIENT
Start: 2022-05-05 | End: 2022-05-05

## 2022-05-05 RX ORDER — SODIUM CHLORIDE 0.9 % (FLUSH) 0.9 %
10 SYRINGE (ML) INJECTION AS NEEDED
Status: DISCONTINUED | OUTPATIENT
Start: 2022-05-05 | End: 2022-05-05 | Stop reason: HOSPADM

## 2022-05-05 RX ORDER — HEPARIN SODIUM 1000 [USP'U]/ML
INJECTION, SOLUTION INTRAVENOUS; SUBCUTANEOUS AS NEEDED
Status: DISCONTINUED | OUTPATIENT
Start: 2022-05-05 | End: 2022-05-05 | Stop reason: HOSPADM

## 2022-05-05 RX ORDER — SODIUM CHLORIDE 9 MG/ML
100 INJECTION, SOLUTION INTRAVENOUS CONTINUOUS
Status: DISCONTINUED | OUTPATIENT
Start: 2022-05-05 | End: 2022-05-05 | Stop reason: HOSPADM

## 2022-05-05 RX ORDER — MIDAZOLAM HYDROCHLORIDE 1 MG/ML
INJECTION INTRAMUSCULAR; INTRAVENOUS AS NEEDED
Status: DISCONTINUED | OUTPATIENT
Start: 2022-05-05 | End: 2022-05-05 | Stop reason: HOSPADM

## 2022-05-05 RX ORDER — ISOSORBIDE MONONITRATE 30 MG/1
30 TABLET, EXTENDED RELEASE ORAL DAILY
Qty: 90 TABLET | Refills: 1 | Status: SHIPPED | OUTPATIENT
Start: 2022-05-05

## 2022-05-05 RX ORDER — ACETAMINOPHEN 325 MG/1
650 TABLET ORAL EVERY 4 HOURS PRN
Status: DISCONTINUED | OUTPATIENT
Start: 2022-05-05 | End: 2022-05-05 | Stop reason: HOSPADM

## 2022-05-05 RX ORDER — LIDOCAINE HYDROCHLORIDE 10 MG/ML
INJECTION, SOLUTION EPIDURAL; INFILTRATION; INTRACAUDAL; PERINEURAL AS NEEDED
Status: DISCONTINUED | OUTPATIENT
Start: 2022-05-05 | End: 2022-05-05 | Stop reason: HOSPADM

## 2022-05-05 RX ORDER — CLOPIDOGREL BISULFATE 75 MG/1
TABLET ORAL AS NEEDED
Status: DISCONTINUED | OUTPATIENT
Start: 2022-05-05 | End: 2022-05-05 | Stop reason: HOSPADM

## 2022-05-05 RX ORDER — ONDANSETRON 2 MG/ML
4 INJECTION INTRAMUSCULAR; INTRAVENOUS EVERY 6 HOURS PRN
Status: DISCONTINUED | OUTPATIENT
Start: 2022-05-05 | End: 2022-05-05 | Stop reason: HOSPADM

## 2022-05-05 RX ADMIN — SODIUM CHLORIDE 3 ML/KG/HR: 9 INJECTION, SOLUTION INTRAVENOUS at 09:02

## 2022-05-06 ENCOUNTER — TELEPHONE (OUTPATIENT)
Dept: CARDIOLOGY | Facility: CLINIC | Age: 47
End: 2022-05-06

## 2022-05-06 ENCOUNTER — DOCUMENTATION (OUTPATIENT)
Dept: CARDIAC REHAB | Facility: HOSPITAL | Age: 47
End: 2022-05-06

## 2022-05-06 ENCOUNTER — CALL CENTER PROGRAMS (OUTPATIENT)
Dept: CALL CENTER | Facility: HOSPITAL | Age: 47
End: 2022-05-06

## 2022-05-06 NOTE — TELEPHONE ENCOUNTER
Pt LVM needing clarification on some of her current medications. Called and further discussed with patient. She verbalized understanding.

## 2022-05-06 NOTE — OUTREACH NOTE
PCI/Device Survey    Flowsheet Row Responses   Facility patient discharged from? [facility]   Procedure date 05/05/22   Procedure (if device, specify in description) PCI   PCI site Left, Arm   Performing MD Dr. Aide Rogers   Attempt successful? Yes   Call start time 1150   Call end time 1202   Nursing interventions Patient education provided   Symptom comments Slight bruise.    Is the patient taking prescribed medications: Plavix   Nursing intervention Nurse provided patient education   Medication comments States MD said to increase to BID, but no    Does the patient have any of the following symptoms related to the cath/surgical site? Bruising, Unusal pain at the site, Redness, warmth, or swelling at the site, Drainage (other than a small amount of blood on the dressing), Bleeding that does not stop after 30 minutes of holding steady pressure on the site, Fever, Arm, hand or leg pale, cool, tingly or numb   Nursing intervention Patient education provided   Does the patient have an appointment scheduled with the cardiologist? Yes   Appointment comments June and July appt.   If the patient is a current smoker, are they able to teach back resources for cessation? [id number]   Did the patient feel prepared to go home on the same day as the procedure? Yes   Is the patient satisfied with the same day discharge process? Yes   Discharge process comments She feels less sore today, reviewed cath site care.   PCI/Device call completed Yes   Wrap up additional comments CHANEL w/ Dr. Santiago nurse to address Plavix issues.          LEYLA SWAIN - Registered Nurse

## 2022-05-09 ENCOUNTER — DOCUMENTATION (OUTPATIENT)
Dept: CARDIAC REHAB | Facility: HOSPITAL | Age: 47
End: 2022-05-09

## 2022-05-09 NOTE — PROGRESS NOTES
Cardiac Rehab staff mailed referral letter to patient regarding Phase II Cardiac Rehab program. Instruction for patient to contact [facility] Cardiac Rehab Department for additional program information and to forward referral to closest facility.

## 2022-07-15 ENCOUNTER — OFFICE VISIT (OUTPATIENT)
Dept: CARDIOLOGY | Facility: CLINIC | Age: 47
End: 2022-07-15

## 2022-07-15 VITALS
OXYGEN SATURATION: 96 % | HEIGHT: 64 IN | DIASTOLIC BLOOD PRESSURE: 80 MMHG | WEIGHT: 215 LBS | BODY MASS INDEX: 36.7 KG/M2 | SYSTOLIC BLOOD PRESSURE: 136 MMHG | HEART RATE: 80 BPM

## 2022-07-15 DIAGNOSIS — R00.0 INAPPROPRIATE SINUS TACHYCARDIA: ICD-10-CM

## 2022-07-15 DIAGNOSIS — I25.110 CORONARY ARTERY DISEASE INVOLVING NATIVE CORONARY ARTERY OF NATIVE HEART WITH UNSTABLE ANGINA PECTORIS: Primary | ICD-10-CM

## 2022-07-15 DIAGNOSIS — I10 ESSENTIAL HYPERTENSION: ICD-10-CM

## 2022-07-15 DIAGNOSIS — E78.2 MIXED HYPERLIPIDEMIA: ICD-10-CM

## 2022-07-15 PROCEDURE — 99214 OFFICE O/P EST MOD 30 MIN: CPT | Performed by: INTERNAL MEDICINE

## 2022-07-15 NOTE — PROGRESS NOTES
"[facility] Cardiology    Encounter Date: 07/15/2022    Patient ID: Petra Tai is a 46 y.o. female.  : 1975     PCP: Morris Brito MD       Chief Complaint: Coronary artery disease involving native coronary artery of      PROBLEM LIST:  1. Inappropriate sinus tachycardia:   a. Developed during pregnancy 7 years ago.  b. Multiple Holter monitors and event recordings showing only arrhythmia to be sinus tachycardia.  c. EP study, 2004, Dr. Lehman: RFA of inappropriate sinus tachycardia with partial sinus node re-modification.  d. Status post ThermoCool radiofrequency ablation for inappropriate sinus tachycardia on 2004.  e. Echo, 2016: EF 63%.  Trace MR noted.  Technically limited study.  f. [facility], 10/2018: NSR, NSVT (4 beats), NSAT (longes 13 sec.)  2. Coronary artery disease:  a. Apparent \"small MI\" in 2013 in [place] with a heart catheterization demonstrating 80% to 90% stenosis of a coronary artery too small to stent. (IDB)  b. Nuclear GXT, 2012: Normal study. EF 67%.  c. Unstable angina, 2018.  d. GXT MPS, 2018: moderate sized fixed mid anterior defect with no reversibility, LVEF 65%, abnormal baseline EKG with ST depression and downsloping noted during recovery; symptoms of chest tightness with exercise.  e. NSVT on [facility], 10/2018.  f. [facility], 2018: Normal LV gram, 60% ramus stenosis with acceptable FFR and deferral of intervention; severe proximal to midsegment disease in dominant RCA treated with OCT guided stenting under the Dye Vert OCT procotol.  g. [facility], 2022: 80% stenosis of the right PDA successfully stented with 2.0 x 18 mm ENRIKE and reduced to 0%. Residual nonobstructive disease involving multiple vessels with patent stents of the RCA. Normal left regular systolic function, estimated EF 60%.  3. Hypertension.  4. Hyperlipidemia.  5. Obesity.  6. + Tobacco abuse since age 17.  7. Gastric sleeve  in [place], [place] [place] (-150 pounds).  8. Surgical history:  g. Gastric sleeve.  h. Partial hysterectomy.  i. R knee meniscus repair.     History of Present Illness  Patient presents today for an after procedure follow-up with a history of coronary artery disease, inappropriate sinus rhythm, and cardiac risk factors. Since last visit, patient underwent cardiac catheterization on 5/5/2022 and had 80% stenosis of right PDA successfully stented.  She did have residual nonobstructive disease involving multiple vessels with patent stents of the RCA.  Since then, the patient denies chest pain, shortness of breath, palpitations, orthopnea, and edema.  She does note that she feels fatigued but she was seen by her primary care and has multiple vitamin deficiencies.  These are currently being supplemented.    Allergies   Allergen Reactions   • Aspirin Hives and Angioedema   • Z-Wolf [Azithromycin] Rash         Current Outpatient Medications:   •  albuterol (PROVENTIL) (2.5 MG/3ML) 0.083% nebulizer solution, Take 2.5 mg by nebulization As Needed for Wheezing., Disp: , Rfl:   •  atorvastatin (LIPITOR) 80 MG tablet, Take 1 tablet by mouth Every Night., Disp: 30 tablet, Rfl: 11  •  budesonide (PULMICORT) 0.25 MG/2ML nebulizer solution, Take 0.25 mg by nebulization Daily., Disp: , Rfl:   •  budesonide-formoterol (SYMBICORT) 160-4.5 MCG/ACT inhaler, Inhale 2 puffs 2 (Two) Times a Day., Disp: , Rfl:   •  clonazePAM (KlonoPIN) 1 MG tablet, Take 1 mg by mouth 3 (Three) Times a Day As Needed for Seizures., Disp: , Rfl:   •  clopidogrel (PLAVIX) 75 MG tablet, Take 1 tablet by mouth Daily., Disp: 90 tablet, Rfl: 2  •  Cyanocobalamin 1000 MCG/ML kit, Inject 1 dose as directed Every 30 (Thirty) Days., Disp: , Rfl:   •  diltiaZEM (CARDIZEM) 90 MG tablet, Take 1 tablet by mouth 4 (Four) Times a Day. (Patient taking differently: Take 90 mg by mouth 4 (Four) Times a Day. 2 tablets 4 times daily), Disp: , Rfl:   •  diphenhydrAMINE (BENADRYL) 50 MG capsule, Take 50  mg by mouth 2 (Two) Times a Day As Needed for Itching., Disp: , Rfl:   •  escitalopram (LEXAPRO) 10 MG tablet, Take 5 mg by mouth Daily., Disp: , Rfl:   •  ezetimibe (ZETIA) 10 MG tablet, TAKE ONE TABLET BY MOUTH EVERY DAY, Disp: 30 tablet, Rfl: 1  •  folic acid (FOLVITE) 1 MG tablet, Take 1 mg by mouth Daily., Disp: , Rfl:   •  furosemide (LASIX) 40 MG tablet, Take 40 mg by mouth daily., Disp: , Rfl:   •  HYDROcodone-acetaminophen (NORCO) 7.5-325 MG per tablet, Take 1 tablet by mouth As Needed., Disp: , Rfl:   •  isosorbide mononitrate (IMDUR) 30 MG 24 hr tablet, Take 1 tablet by mouth Daily., Disp: 90 tablet, Rfl: 1  •  lisinopril-hydrochlorothiazide (PRINZIDE,ZESTORETIC) 10-12.5 MG per tablet, Take 1 tablet by mouth Daily., Disp: , Rfl:   •  meloxicam (MOBIC) 15 MG tablet, Take 15 mg by mouth Daily., Disp: , Rfl:   •  metoprolol tartrate (LOPRESSOR) 100 MG tablet, Take 50 mg by mouth 2 (Two) Times a Day., Disp: , Rfl:   •  Multiple Vitamin (MULTI-VITAMIN DAILY PO), Take 1 tablet by mouth 2 (Two) Times a Day., Disp: , Rfl:   •  nitroglycerin (NITROSTAT) 0.4 MG SL tablet, Place 0.4 mg under the tongue As Needed for Chest Pain. Take no more than 3 doses in 15 minutes., Disp: , Rfl:   •  pantoprazole (PROTONIX) 40 MG EC tablet, Take 40 mg by mouth Daily., Disp: , Rfl:   •  potassium chloride (K-DUR,KLOR-CON) 20 MEQ CR tablet, Take 1 tablet by mouth 2 (Two) Times a Day., Disp: , Rfl: 3  •  VITAMIN D PO, Take 50,000 Units by mouth 1 (One) Time Per Week., Disp: , Rfl:     The following portions of the patient's history were reviewed and updated as appropriate: allergies, current medications, past family history, past medical history, past social history, past surgical history and problem list.    ROS  Review of Systems   Constitution: Negative for chills, fever, fatigue, generalized weakness.   Cardiovascular: Negative for chest pain, dyspnea on exertion, leg swelling, palpitations, orthopnea, and syncope.  "  Respiratory: Negative for cough, shortness of breath, and wheezing.  HENT: Negative for ear pain, nosebleeds, and tinnitus.  Gastrointestinal: Negative for abdominal pain, constipation, diarrhea, nausea and vomiting.   Genitourinary: No urinary symptoms.  Musculoskeletal: Negative for muscle cramps.  Neurological: Negative for dizziness, headaches, loss of balance, numbness, and symptoms of stroke.  Psychiatric: Normal mental status.     All other systems reviewed and are negative.        Objective:     /80 (BP Location: Left arm, Patient Position: Sitting)   Pulse 80   Ht 162.6 cm (64\")   Wt 97.5 kg (215 lb)   LMP  (LMP Unknown)   SpO2 96%   BMI 36.90 kg/m²      Physical Exam  Constitutional: Patient appears well-developed and well-nourished.   HENT: HEENT exam unremarkable.   Neck: Neck supple. No JVD present. No carotid bruits.   Cardiovascular: Normal rate, regular rhythm and normal heart sounds. No murmur heard.   2+ symmetric pulses.   Pulmonary/Chest: Breath sounds normal. Does not exhibit tenderness.   Abdominal: Abdomen benign.   Musculoskeletal: Does not exhibit edema.   Neurological: Neurological exam unremarkable.   Vitals reviewed.    Data Review:   Lab Results   Component Value Date    GLUCOSE 84 05/05/2022    BUN 17 05/05/2022    CREATININE 0.60 05/05/2022    BCR 27.0 (H) 05/05/2022    K 4.0 05/05/2022    CO2 28.0 05/05/2022    CALCIUM 9.3 05/05/2022    ALBUMIN 4.20 05/05/2022    AST 15 05/05/2022    ALT 12 05/05/2022     Lab Results   Component Value Date    CHOL 145 05/05/2022    TRIG 103 05/05/2022    HDL 45 05/05/2022    LDL 81 05/05/2022      Lab Results   Component Value Date    WBC 9.75 05/05/2022    RBC 4.74 05/05/2022    HGB 15.2 05/05/2022    HCT 44.7 05/05/2022    MCV 94.3 05/05/2022     05/05/2022     Lab Results   Component Value Date    HGBA1C 5.50 05/05/2022        Procedures           Assessment:      Diagnosis Plan   1. Coronary artery disease involving native " coronary artery of native heart with unstable angina pectoris (HCC)   stable without current angina.  Continue Plavix.  No aspirin secondary to angioedema.   2. Inappropriate sinus tachycardia   rate controlled today.  Continue metoprolol   3. Essential hypertension   BP well controlled.  Continue current hypertensive regimen.   4. Mixed hyperlipidemia   LDL slightly above target.  Continue high intensity statin, diet, exercise     Plan:   Stable cardiac status.  No angina or CHF symptoms.  Continue current medications.   FU in 6 MO, sooner as needed.  Thank you for allowing us to participate in the care of your patient.     Seda Pantoja PA-C      Please note that portions of this note may have been completed with a voice recognition program. Efforts were made to edit the dictations, but occasionally words are mistranscribed.

## 2022-08-18 NOTE — PROGRESS NOTES
Patient calling because he was seen in clinic yesterday for Kidney Stones and wants to know if there's any other updates for them after his imaging. Please call, thank you!   Petra Tai  1975  [phone]    12/19/2018    [facility] CARDIOLOGY     Morris Brito MD  [address] [id number]    Chief Complaint   Patient presents with   • Inappropriate sinus tachycardia       PROBLEM LIST:  1. Inappropriate sinus tachycardia:   a. Developed during pregnancy 7 years ago.  b. Multiple Holter monitors and event recordings showing only arrhythmia to be sinus tachycardia.  c. Echocardiogram, August 2002, which showed low normal LV function with an estimated EF of 45% to 50%.  d. MUGA scan, September 2002, showed normal wall motion with an EF of 57%.  e. EP study, 04/12/2004, Dr. Lehman: RFA of inappropriate sinus tachycardia with partial sinus node re-modification.  f. Status post ThermoCool radiofrequency ablation for inappropriate sinus tachycardia on 11/20/2004.  g. Event recorder and echocardiogram with Dr. Brito: Normal per patient, no data.  h. Event recorder, January 2012, with heart rate . Questionable nonsustained atrial tachycardia versus exit block.   i. Nuclear GXT, 07/25/2012: Normal study. EF 67%  j. Echocardiogram 9/28/2016: EF 63%.  Trace MR noted.  Technically limited study.  k. Holter 11/18: NSR, NSAT, NSVT  2. Myocardial infarction.  a. Apparent hospitalization in April 2013 in [place] with a heart catheterization demonstrating 80% to 90% stenosis of a coronary artery too small to stent.   b. Positive troponins, database incomplete.   c. Heart cath 11/18: NL LV EF, 60% stenosis IR with acceptable FFR, PTCA/stent RCA 70% guided OCT  3. Hypertension x7 years.  4. Hyperlipidemia.  5. Obesity.  6. + Tobacco abuse since age 17  7. Gastric sleeve 12/16 in [place], [place] (-150 pounds)   Allergies  Allergies   Allergen Reactions   • Aspirin Hives and Angioedema   • Z-Wolf [Azithromycin]        Current Medications    Current Outpatient Medications:   •  albuterol (PROVENTIL) (2.5 MG/3ML) 0.083% nebulizer solution, Take 2.5 mg by  nebulization As Needed for Wheezing., Disp: , Rfl:   •  atorvastatin (LIPITOR) 80 MG tablet, Take 1 tablet by mouth Every Night., Disp: 30 tablet, Rfl: 11  •  clonazePAM (KlonoPIN) 1 MG tablet, Take 1 mg by mouth as needed for seizures., Disp: , Rfl:   •  clopidogrel (PLAVIX) 75 MG tablet, Take 1 tablet by mouth Daily., Disp: 90 tablet, Rfl: 3  •  diltiaZEM (CARDIZEM) 90 MG tablet, Take 1 tablet by mouth 4 (Four) Times a Day., Disp: , Rfl:   •  diphenhydrAMINE (BENADRYL) 25 mg capsule, Take 25 mg by mouth As Needed., Disp: , Rfl:   •  Ergocalciferol (VITAMIN D2 PO), Take 50,000 Units by mouth 1 (One) Time Per Week., Disp: , Rfl:   •  folic acid (FOLVITE) 1 MG tablet, Take 1 mg by mouth Daily., Disp: , Rfl:   •  furosemide (LASIX) 40 MG tablet, Take 40 mg by mouth daily., Disp: , Rfl:   •  HYDROcodone-acetaminophen (NORCO) 7.5-325 MG per tablet, Take 1 tablet by mouth As Needed., Disp: , Rfl:   •  levalbuterol (XOPENEX HFA) 45 MCG/ACT inhaler, Inhale 1-2 puffs Every 4 (Four) Hours As Needed for Wheezing., Disp: , Rfl:   •  losartan (COZAAR) 50 MG tablet, Take 50 mg by mouth Daily., Disp: , Rfl:   •  Meloxicam 10 MG capsule, Take 15 mg by mouth Daily., Disp: , Rfl:   •  metoprolol tartrate (LOPRESSOR) 100 MG tablet, Take 100 mg by mouth 2 (Two) Times a Day., Disp: , Rfl:   •  Multiple Vitamin (MULTI-VITAMIN DAILY PO), Take 1 tablet by mouth Daily., Disp: , Rfl:   •  nitroglycerin (NITROSTAT) 0.4 MG SL tablet, Place 0.4 mg under the tongue As Needed for Chest Pain. Take no more than 3 doses in 15 minutes., Disp: , Rfl:   •  pantoprazole (PROTONIX) 40 MG EC tablet, Take 40 mg by mouth Daily., Disp: , Rfl:   •  Potassium (POTASSIMIN PO), Take 20 mEq by mouth Daily., Disp: , Rfl:   •  sodium chloride 0.9 % nebulizer solution, Take 3 mL by nebulization As Needed for Wheezing., Disp: , Rfl:     History of Present Illness     Pt presents for follow up of IAST/CAD/HTN/CAD. Since we last saw the pt, pt had CP and palps. Had  "abnormal Cardiolyte. Had heart cath and PTCA/stent of RCA. Since that time, she has done better. Less CP. Denies any AF episodes or sustained palps, SOB, LH, and dizziness. Denies any hospitalizations, ER visits, bleeding, or TIA/CVA symptoms. Overall feels well. + RARE PALPS. BP at home stable for the most part. Trying to stop smoking    ROS:  General:  + fatigue, No weight gain or loss  Cardiovascular:  Denies PND, syncope, near syncope, edema + palpitations.  Pulmonary:  + chronic MEDINA, NO cough, or wheezing      Vitals:    12/19/18 1300   BP: 140/76   BP Location: Left arm   Patient Position: Sitting   Pulse: 64   SpO2: 98%   Weight: 79.8 kg (176 lb)   Height: 162.6 cm (64\")     Body mass index is 30.21 kg/m².  PE:  General: NAD  Neck: no JVD, no carotid bruits, no TM  Heart RRR, NL S1, S2, S4 present, no rubs, murmurs  Lungs: CTA, no wheezes, rhonchi, or rales  Abd: soft, non-tender, NL BS  Ext: No musculoskeletal deformities, no edema, cyanosis, or clubbing  Psych: normal mood and affect    Diagnostic Data:      Procedures    1. Inappropriate sinus tachycardia    2. Coronary artery disease involving native coronary artery of native heart without angina pectoris    3. Essential hypertension    4. Vasovagal syncope          Plan:  1. IST:  Rare breakthrough palpitations on BB and CCB. dOING WELL  2. HTN: Controlled   3. CAD: with recent PTCA/Stent RCA: F/up with Dr Awad  4. Near syncope:  Better  5. Tobacco abuse: Discussed cessation.     F/up in 12 months      "

## 2023-02-10 ENCOUNTER — OFFICE VISIT (OUTPATIENT)
Dept: CARDIOLOGY | Facility: CLINIC | Age: 48
End: 2023-02-10
Payer: MEDICARE

## 2023-02-10 VITALS
WEIGHT: 210 LBS | OXYGEN SATURATION: 98 % | DIASTOLIC BLOOD PRESSURE: 84 MMHG | HEART RATE: 107 BPM | SYSTOLIC BLOOD PRESSURE: 128 MMHG | BODY MASS INDEX: 35.85 KG/M2 | HEIGHT: 64 IN

## 2023-02-10 DIAGNOSIS — R00.0 INAPPROPRIATE SINUS TACHYCARDIA: ICD-10-CM

## 2023-02-10 DIAGNOSIS — I10 ESSENTIAL HYPERTENSION: ICD-10-CM

## 2023-02-10 DIAGNOSIS — I25.118 CORONARY ARTERY DISEASE INVOLVING NATIVE CORONARY ARTERY OF NATIVE HEART WITH OTHER FORM OF ANGINA PECTORIS: Primary | ICD-10-CM

## 2023-02-10 DIAGNOSIS — E78.2 MIXED HYPERLIPIDEMIA: ICD-10-CM

## 2023-02-10 PROCEDURE — 93000 ELECTROCARDIOGRAM COMPLETE: CPT | Performed by: INTERNAL MEDICINE

## 2023-02-10 PROCEDURE — 99214 OFFICE O/P EST MOD 30 MIN: CPT | Performed by: INTERNAL MEDICINE

## 2023-02-10 NOTE — PROGRESS NOTES
"[facility] Cardiology    Encounter Date: 02/10/2023    Patient ID: Petra Tai is a 47 y.o. female.  : 1975     PCP: Morris Brito MD       Chief Complaint: Coronary artery disease involving native coronary artery of      PROBLEM LIST:  1. Inappropriate sinus tachycardia:   a. Developed during pregnancy 7 years ago.  b. Multiple Holter monitors and event recordings showing only arrhythmia to be sinus tachycardia.  c. EP study, 2004, Dr. Lehman: RFA of inappropriate sinus tachycardia with partial sinus node re-modification.  d. Status post ThermoCool radiofrequency ablation for inappropriate sinus tachycardia on 2004.  e. Echo, 2016: EF 63%.  Trace MR noted.  Technically limited study.  f. [facility], 10/2018: NSR, NSVT (4 beats), NSAT (longes 13 sec.)  2. Coronary artery disease:  a. Apparent \"small MI\" in 2013 in [place] with a heart catheterization demonstrating 80% to 90% stenosis of a coronary artery too small to stent. (IDB)  b. Nuclear GXT, 2012: Normal study. EF 67%.  c. Unstable angina, 2018.  d. GXT [facility], 2018: moderate sized fixed mid anterior defect with no reversibility, LVEF 65%, abnormal baseline EKG with ST depression and downsloping noted during recovery; symptoms of chest tightness with exercise.  e. NSVT on [facility], 10/2018.  f. [facility], 2018: Normal LV gram, 60% ramus stenosis with acceptable FFR and deferral of intervention; severe proximal to midsegment disease in dominant RCA treated with OCT guided stenting under the Dye Vert OCT procotol.  g. [facility], 2022: 80% stenosis of the right PDA successfully stented with 2.0 x 18 mm ENRIKE and reduced to 0%. Residual nonobstructive disease involving multiple vessels with patent stents of the RCA. Normal left regular systolic function, estimated EF 60%.  3. Hypertension.  4. Hyperlipidemia.  5. Obesity.  6. + Tobacco abuse since age 17.  7. Gastric sleeve  in [place], [place] (-150 pounds).  8. Surgical history:  g. Gastric sleeve.  h. Partial hysterectomy.  i. R knee meniscus repair.     History of Present Illness  Patient presents today for a follow-up with a history of CAD, inappropriate sinus tachycardia, and cardiac risk factors. Since last visit, patient has been doing well overall from a cardiovascular standpoint. She states that she continues to have a high pulse.  She was running a borderline blood pressure and PCP reduce the dose of metoprolol to 50 twice daily from 100 twice daily.  She remains active and busy with household chores.  Has occasional brief chest pain which is self-limiting.  He has not required any nitroglycerin for  Denies edema orthopnea PND palpitation or syncope    Allergies   Allergen Reactions   • Aspirin Hives and Angioedema   • Z-Wolf [Azithromycin] Rash         Current Outpatient Medications:   •  albuterol (PROVENTIL) (2.5 MG/3ML) 0.083% nebulizer solution, Take 2.5 mg by nebulization As Needed for Wheezing., Disp: , Rfl:   •  atorvastatin (LIPITOR) 80 MG tablet, Take 1 tablet by mouth Every Night., Disp: 30 tablet, Rfl: 11  •  budesonide (PULMICORT) 0.25 MG/2ML nebulizer solution, Take 0.25 mg by nebulization Daily., Disp: , Rfl:   •  budesonide-formoterol (SYMBICORT) 160-4.5 MCG/ACT inhaler, Inhale 2 puffs 2 (Two) Times a Day., Disp: , Rfl:   •  clonazePAM (KlonoPIN) 1 MG tablet, Take 1 mg by mouth 3 (Three) Times a Day As Needed for Seizures., Disp: , Rfl:   •  clopidogrel (PLAVIX) 75 MG tablet, Take 1 tablet by mouth Daily., Disp: 90 tablet, Rfl: 2  •  Cyanocobalamin 1000 MCG/ML kit, Inject 1 dose as directed Every 30 (Thirty) Days., Disp: , Rfl:   •  diltiaZEM (CARDIZEM) 90 MG tablet, Take 1 tablet by mouth 4 (Four) Times a Day. (Patient taking differently: Take 90 mg by mouth 4 (Four) Times a Day. 2 tablets 4 times daily), Disp: , Rfl:   •  diphenhydrAMINE (BENADRYL) 50 MG capsule, Take 50 mg by mouth 2 (Two) Times a Day As Needed for Itching.,  "Disp: , Rfl:   •  escitalopram (LEXAPRO) 10 MG tablet, Take 5 mg by mouth Daily., Disp: , Rfl:   •  ezetimibe (ZETIA) 10 MG tablet, TAKE ONE TABLET BY MOUTH EVERY DAY, Disp: 30 tablet, Rfl: 1  •  folic acid (FOLVITE) 1 MG tablet, Take 1 mg by mouth Daily., Disp: , Rfl:   •  furosemide (LASIX) 40 MG tablet, Take 40 mg by mouth daily., Disp: , Rfl:   •  HYDROcodone-acetaminophen (NORCO) 7.5-325 MG per tablet, Take 1 tablet by mouth As Needed., Disp: , Rfl:   •  isosorbide mononitrate (IMDUR) 30 MG 24 hr tablet, Take 1 tablet by mouth Daily., Disp: 90 tablet, Rfl: 1  •  lisinopril-hydrochlorothiazide (PRINZIDE,ZESTORETIC) 10-12.5 MG per tablet, Take 1 tablet by mouth Daily., Disp: , Rfl:   •  meloxicam (MOBIC) 15 MG tablet, Take 15 mg by mouth Daily., Disp: , Rfl:   •  metoprolol tartrate (LOPRESSOR) 100 MG tablet, Take 50 mg by mouth 2 (Two) Times a Day., Disp: , Rfl:   •  Multiple Vitamin (MULTI-VITAMIN DAILY PO), Take 1 tablet by mouth 2 (Two) Times a Day., Disp: , Rfl:   •  nitroglycerin (NITROSTAT) 0.4 MG SL tablet, Place 0.4 mg under the tongue As Needed for Chest Pain. Take no more than 3 doses in 15 minutes., Disp: , Rfl:   •  pantoprazole (PROTONIX) 40 MG EC tablet, Take 40 mg by mouth Daily., Disp: , Rfl:   •  potassium chloride (K-DUR,KLOR-CON) 20 MEQ CR tablet, Take 1 tablet by mouth 2 (Two) Times a Day., Disp: , Rfl: 3  •  VITAMIN D PO, Take 50,000 Units by mouth 1 (One) Time Per Week., Disp: , Rfl:     The following portions of the patient's history were reviewed and updated as appropriate: allergies, current medications, past family history, past medical history, past social history, past surgical history and problem list.    ROS  Review of Systems   14 point ROS negative except for that listed in the HPI.         Objective:     /84 (BP Location: Left arm, Patient Position: Sitting, Cuff Size: Adult)   Pulse 107   Ht 162.6 cm (64\")   Wt 95.3 kg (210 lb)   LMP  (LMP Unknown)   SpO2 98%   BMI " 36.05 kg/m²      Physical Exam  Constitutional: Patient appears well-developed and well-nourished.   HENT: HEENT exam unremarkable.   Neck: Neck supple. No JVD present. No carotid bruits.   Cardiovascular: Normal rate, regular rhythm and normal heart sounds. No murmur heard.   2+ symmetric pulses.   Pulmonary/Chest: Breath sounds normal. Does not exhibit tenderness.   Abdominal: Abdomen benign.   Musculoskeletal: Does not exhibit edema.   Neurological: Neurological exam unremarkable.   Vitals reviewed.    Data Review:   Lab Results   Component Value Date    GLUCOSE 84 05/05/2022    BUN 17 05/05/2022    CREATININE 0.60 05/05/2022    BCR 27.0 (H) 05/05/2022     05/05/2022    K 4.0 05/05/2022    CO2 28.0 05/05/2022    CALCIUM 9.3 05/05/2022    ALBUMIN 4.20 05/05/2022    AST 15 05/05/2022    ALT 12 05/05/2022     Lab Results   Component Value Date    CHOL 145 05/05/2022    TRIG 103 05/05/2022    HDL 45 05/05/2022    LDL 81 05/05/2022      Lab Results   Component Value Date    WBC 9.75 05/05/2022    RBC 4.74 05/05/2022    HGB 15.2 05/05/2022    HCT 44.7 05/05/2022    MCV 94.3 05/05/2022     05/05/2022     Lab Results   Component Value Date    HGBA1C 5.50 05/05/2022          ECG 12 Lead    Date/Time: 2/10/2023 2:30 PM  Performed by: Aide Rogers MD  Authorized by: Aide Rogers MD   Comparison: compared with previous ECG from 6/29/2021  Comparison to previous ECG: Now sinus tachycardia  Rhythm: sinus tachycardia  BPM: 107  Comments: Possible left atrial enlargement                 Assessment:      Diagnosis Plan   1. Coronary artery disease involving native coronary artery of native heart with stable angina Patient experiences infrequent chest pains presumably due to tachycardia. Continue on Plavix 75 mg daily for antiplatelet therapy. Continue on Imdur 30 mg daily and nitroglycerin 0.4 mg as needed for chest pain.  Continue current dose of metoprolol and Cardizem   2. Inappropriate sinus tachycardia   Stable. Continue on diltiazem 90 mg four times daily and metoprolol 50 twice daily, may take extra metoprolol in case of symptomatic tachycardia advised to limit the use of Lasix to as needed and maintain good hydration/avoid caffeine   3. Essential hypertension  Well controlled.  Continue current antihypertensive therapy, take Lasix only as needed for weight gain/fluid retention.    4. Mixed hyperlipidemia  Continue to follow up with PCP for cholesterol management. Continue on atorvastatin 80 mg daily and Zetia 10 mg daily for hyperlipidemia.      Plan:   Stable cardiac status. No current significant angina or CHF symptoms.  Patient is recommended to take Lasix 40 mg only as needed on days when she has noted more than 2 pounds of weight gain or fluid retention.    Continue all other current medications.  Avoid caffeine, maintain hydration, may take extra metoprolol for symptomatic tachycardia.  FU in 6 MO, sooner as needed.  Thank you for allowing us to participate in the care of your patient.     Scribed for Aide Rogers MD by Neeta Cantu. 2/10/2023 14:14 EST    I, Aide Rogers MD, personally performed the services described in this documentation as scribed by the above named individual in my presence, and it is both accurate and complete.  2/10/2023  15:26 EST      Please note that portions of this note may have been completed with a voice recognition program. Efforts were made to edit the dictations, but occasionally words are mistranscribed.

## 2023-09-22 ENCOUNTER — OFFICE VISIT (OUTPATIENT)
Dept: CARDIOLOGY | Facility: CLINIC | Age: 48
End: 2023-09-22
Payer: MEDICARE

## 2023-09-22 VITALS
OXYGEN SATURATION: 97 % | HEIGHT: 64 IN | BODY MASS INDEX: 31.41 KG/M2 | DIASTOLIC BLOOD PRESSURE: 72 MMHG | HEART RATE: 81 BPM | SYSTOLIC BLOOD PRESSURE: 122 MMHG | WEIGHT: 184 LBS

## 2023-09-22 DIAGNOSIS — R00.0 INAPPROPRIATE SINUS TACHYCARDIA: ICD-10-CM

## 2023-09-22 DIAGNOSIS — I10 ESSENTIAL HYPERTENSION: ICD-10-CM

## 2023-09-22 DIAGNOSIS — E78.2 MIXED HYPERLIPIDEMIA: ICD-10-CM

## 2023-09-22 DIAGNOSIS — I25.118 CORONARY ARTERY DISEASE OF NATIVE ARTERY OF NATIVE HEART WITH STABLE ANGINA PECTORIS: Primary | ICD-10-CM

## 2023-09-22 RX ORDER — METOPROLOL SUCCINATE 50 MG/1
50 TABLET, EXTENDED RELEASE ORAL DAILY
COMMUNITY
Start: 2023-09-11 | End: 2023-09-22

## 2023-09-22 NOTE — PROGRESS NOTES
"[facility]    Encounter Date: 2023    Patient ID: Petra Tai is a 47 y.o. female.  : 1975     PCP: Morris Brito MD       Chief Complaint: Coronary Artery Disease, Hypertension, Hyperlipidemia, and Rapid Heart Rate      PROBLEM LIST:  Inappropriate sinus tachycardia:   Developed during pregnancy 7 years ago.  Multiple Holter monitors and event recordings showing only arrhythmia to be sinus tachycardia.  EP study, 2004, Dr. Lehman: RFA of inappropriate sinus tachycardia with partial sinus node re-modification.  Status post ThermoCool radiofrequency ablation for inappropriate sinus tachycardia on 2004.  Echo, 2016: EF 63%.  Trace MR noted.  Technically limited study.  [facility], 10/2018: NSR, NSVT (4 beats), NSAT (longes 13 sec.)  Coronary artery disease:  Apparent \"small MI\" in 2013 in [place] with a heart catheterization demonstrating 80% to 90% stenosis of a coronary artery too small to stent. (IDB)  Nuclear GXT, 2012: Normal study. EF 67%.  Unstable angina, 2018.  GXT MPS, 2018: moderate sized fixed mid anterior defect with no reversibility, LVEF 65%, abnormal baseline EKG with ST depression and downsloping noted during recovery; symptoms of chest tightness with exercise.  NSVT on [facility], 10/2018.  [facility], 2018: Normal LV gram, 60% ramus stenosis with acceptable FFR and deferral of intervention; severe proximal to midsegment disease in dominant RCA treated with OCT guided stenting under the Dye Vert OCT procotol.  [facility], 2022: 80% stenosis of the right PDA successfully stented with 2.0 x 18 mm ENRIKE and reduced to 0%. Residual nonobstructive disease involving multiple vessels with patent stents of the RCA. Normal left regular systolic function, estimated EF 60%.  Hypertension.  Hyperlipidemia.  Obesity.  + Tobacco abuse since age 17.  Gastric sleeve  in [place], [place] (-150 pounds).  Surgical history:  Gastric " sleeve.  Partial hysterectomy.  R knee meniscus repair.     History of Present Illness  Patient presents today for a follow-up with a history of CAD, inappropriate sinus tachycardia, and cardiac risk factors. Since last visit she has done overall well from cardiac standpoint.  Remains active, states that she walks up to 45 minutes on the treadmill which causes her heart rate to go up and does not cause any chest pain however she reports 2 episodes of chest discomfort which were self-limiting.  During one of the episodes she took a nitroglycerin.  She has been taken off Imdur by PCP due to complaints of headache and dizziness.  She is still smoking half pack of cigarettes a day.  No significant chest pain.  Denies shortness of breath edema palpitations dizziness or syncope.    Allergies   Allergen Reactions    Aspirin Hives and Angioedema    Z-Wolf [Azithromycin] Rash         Current Outpatient Medications:     albuterol (PROVENTIL) (2.5 MG/3ML) 0.083% nebulizer solution, Take 2.5 mg by nebulization As Needed for Wheezing., Disp: , Rfl:     budesonide (PULMICORT) 0.25 MG/2ML nebulizer solution, Take 2 mL by nebulization As Needed., Disp: , Rfl:     budesonide-formoterol (SYMBICORT) 160-4.5 MCG/ACT inhaler, Inhale 2 puffs 2 (Two) Times a Day., Disp: , Rfl:     clonazePAM (KlonoPIN) 1 MG tablet, Take 1 tablet by mouth 3 (Three) Times a Day As Needed for Seizures., Disp: , Rfl:     clopidogrel (PLAVIX) 75 MG tablet, Take 1 tablet by mouth Daily., Disp: 90 tablet, Rfl: 2    Cyanocobalamin 1000 MCG/ML kit, Inject 1 dose as directed Every 30 (Thirty) Days., Disp: , Rfl:     diltiaZEM (CARDIZEM) 90 MG tablet, Take 1 tablet by mouth 4 (Four) Times a Day. (Patient taking differently: Take 1 tablet by mouth 4 (Four) Times a Day. 2 tablets 4 times daily), Disp: , Rfl:     diphenhydrAMINE (BENADRYL) 50 MG capsule, Take 1 capsule by mouth 2 (Two) Times a Day As Needed for Itching., Disp: , Rfl:     escitalopram (LEXAPRO) 10 MG  "tablet, Take 1 tablet by mouth Daily. 0.5 tablet daily, Disp: , Rfl:     ezetimibe (ZETIA) 10 MG tablet, TAKE ONE TABLET BY MOUTH EVERY DAY, Disp: 30 tablet, Rfl: 1    folic acid (FOLVITE) 1 MG tablet, Take 1 tablet by mouth Daily., Disp: , Rfl:     furosemide (LASIX) 40 MG tablet, Take 1 tablet by mouth Daily., Disp: , Rfl:     HYDROcodone-acetaminophen (NORCO) 7.5-325 MG per tablet, Take 1 tablet by mouth As Needed., Disp: , Rfl:     lisinopril-hydrochlorothiazide (PRINZIDE,ZESTORETIC) 10-12.5 MG per tablet, Take 1 tablet by mouth Daily., Disp: , Rfl:     meloxicam (MOBIC) 15 MG tablet, Take 1 tablet by mouth As Needed., Disp: , Rfl:     metoprolol tartrate (LOPRESSOR) 50 MG tablet, Take 1 tablet by mouth 2 (Two) Times a Day., Disp: , Rfl:     Mounjaro 2.5 MG/0.5ML solution pen-injector, 1 (One) Time Per Week., Disp: , Rfl:     Multiple Vitamin (MULTI-VITAMIN DAILY PO), Take 1 tablet by mouth 2 (Two) Times a Day., Disp: , Rfl:     nitroglycerin (NITROSTAT) 0.4 MG SL tablet, Place 1 tablet under the tongue As Needed for Chest Pain. Take no more than 3 doses in 15 minutes., Disp: , Rfl:     pantoprazole (PROTONIX) 40 MG EC tablet, Take 1 tablet by mouth 2 (Two) Times a Day., Disp: , Rfl:     potassium chloride (K-DUR,KLOR-CON) 20 MEQ CR tablet, Take 1 tablet by mouth 2 (Two) Times a Day., Disp: , Rfl: 3    rosuvastatin (CRESTOR) 40 MG tablet, Take 1 tablet by mouth Daily., Disp: , Rfl:     VITAMIN D PO, Take 50,000 Units by mouth 1 (One) Time Per Week., Disp: , Rfl:     The following portions of the patient's history were reviewed and updated as appropriate: allergies, current medications, past family history, past medical history, past social history, past surgical history and problem list.    ROS  Review of Systems   14 point ROS negative except for that listed in the HPI.         Objective:     /72 (BP Location: Right arm, Patient Position: Sitting)   Pulse 81   Ht 162.6 cm (64.02\")   Wt 83.5 kg (184 lb) "   LMP  (LMP Unknown)   SpO2 97%   BMI 31.57 kg/m²      Physical Exam  Constitutional: Patient appears well-developed and well-nourished.   HENT: HEENT exam unremarkable.   Neck: Neck supple. No JVD present. No carotid bruits.   Cardiovascular: Normal rate, regular rhythm and normal heart sounds. No murmur heard.   2+ symmetric pulses.   Pulmonary/Chest: Breath sounds normal. Does not exhibit tenderness.   Abdominal: Abdomen benign.   Musculoskeletal: Does not exhibit edema.   Neurological: Neurological exam unremarkable.   Vitals reviewed.    Data Review:   Lab date: 03/02/2023  FLP: , TG 85, HDL 55,   CMP: Glu 92, BUN 15, Creat 0.69, eGFR >60, Na 139, K 4.2, Cl 109, CO2 26, Ca 9.2, Alk Phos 81, AST 16, ALT 19  CBC: WBC 9.14, RBC 4.98, HGB 15, HCT 46.6, MCV 93.6, MCH 30.1,           Assessment:      Diagnosis Plan   1. Coronary artery disease of native artery of native heart with stable angina pectoris  Overall stable, continue Plavix for antiplatelet therapy, she is allergic to aspirin.  Discussed that occasional self-limiting angina does not indicate need for further testing.  She can restart Imdur or continue to take nitroglycerin as needed.   2. Inappropriate sinus tachycardia  Managed with combination of diltiazem and metoprolol, stable, continue current therapy.   3. Essential hypertension  Well-controlled, continue current medications.   4. Mixed hyperlipidemia  Had been noncompliant with lipid management, now back on Zetia and Crestor.  Follow-up with PCP for reassessment of lipid profile and further management.     Plan:   Stable cardiac status.  Occasional stable angina which is self-limiting in response to nitroglycerin.  Strongly encouraged smoking cessation and counseled patient >3 minutes.  Patient was encouraged to continue to be active and have a healthy diet.  Follow-up with PCP for reassessment and management of dyslipidemia.  In case of persistent LDL elevation above 70 she  will benefit from PCSK9 inhibitors.  Continue current medications.   FU in 6 MO, sooner as needed.  Thank you for allowing us to participate in the care of your patient.       Scribed for Aide Rogers MD by Amber Carl. 9/22/2023 11:44 EDT    I, Aide Rogers MD, personally performed the services described in this documentation as scribed by the above named individual in my presence, and it is both accurate and complete.  9/22/2023  11:51 EDT      Please note that portions of this note may have been completed with a voice recognition program. Efforts were made to edit the dictations, but occasionally words are mistranscribed.

## 2023-10-02 PROCEDURE — 93000 ELECTROCARDIOGRAM COMPLETE: CPT | Performed by: INTERNAL MEDICINE

## 2024-09-27 ENCOUNTER — OFFICE VISIT (OUTPATIENT)
Dept: CARDIOLOGY | Facility: CLINIC | Age: 49
End: 2024-09-27
Payer: MEDICARE

## 2024-09-27 VITALS
SYSTOLIC BLOOD PRESSURE: 118 MMHG | HEART RATE: 56 BPM | BODY MASS INDEX: 24.24 KG/M2 | HEIGHT: 64 IN | WEIGHT: 142 LBS | DIASTOLIC BLOOD PRESSURE: 74 MMHG | OXYGEN SATURATION: 97 %

## 2024-09-27 DIAGNOSIS — R42 DIZZINESS: ICD-10-CM

## 2024-09-27 DIAGNOSIS — I47.11 INAPPROPRIATE SINUS TACHYCARDIA: ICD-10-CM

## 2024-09-27 DIAGNOSIS — E78.5 DYSLIPIDEMIA: ICD-10-CM

## 2024-09-27 DIAGNOSIS — I10 ESSENTIAL HYPERTENSION: ICD-10-CM

## 2024-09-27 DIAGNOSIS — I25.10 CORONARY ARTERY DISEASE INVOLVING NATIVE CORONARY ARTERY OF NATIVE HEART WITHOUT ANGINA PECTORIS: Primary | ICD-10-CM

## 2024-09-27 DIAGNOSIS — R00.2 PALPITATIONS: ICD-10-CM

## 2024-09-27 DIAGNOSIS — R55 VASOVAGAL SYNCOPE: ICD-10-CM

## 2024-09-27 PROCEDURE — 1159F MED LIST DOCD IN RCRD: CPT | Performed by: INTERNAL MEDICINE

## 2024-09-27 PROCEDURE — 99214 OFFICE O/P EST MOD 30 MIN: CPT | Performed by: INTERNAL MEDICINE

## 2024-09-27 PROCEDURE — 1160F RVW MEDS BY RX/DR IN RCRD: CPT | Performed by: INTERNAL MEDICINE

## 2024-09-27 PROCEDURE — 3078F DIAST BP <80 MM HG: CPT | Performed by: INTERNAL MEDICINE

## 2024-09-27 PROCEDURE — 3074F SYST BP LT 130 MM HG: CPT | Performed by: INTERNAL MEDICINE

## 2025-03-31 NOTE — PROGRESS NOTES
"[facility] Cardiology    Encounter Date: 2025    Patient ID: Petra Tai is a 49 y.o. female.  : 1975     PCP: Morris Brito MD       Chief Complaint: Coronary artery disease involving native coronary artery of      PROBLEM LIST:  Inappropriate sinus tachycardia:   Developed during pregnancy 7 years ago.  Multiple Holter monitors and event recordings showing only arrhythmia to be sinus tachycardia.  EP study, 2004, Dr. Lehman: RFA of inappropriate sinus tachycardia with partial sinus node re-modification.  Status post ThermoCool radiofrequency ablation for inappropriate sinus tachycardia on 2004.  Echo, 2016: EF 63%.  Trace MR noted.  Technically limited study.  [facility], 10/2018: NSR, NSVT (4 beats), NSAT (longes 13 sec.)  Holter, 2024: SR with average HR of 69/min. Occasional PACs (1.6%). Rare PVCs. 4 beat run of PAT/SVT.  Coronary artery disease:  Apparent \"small MI\" in 2013 in [place] with a heart catheterization demonstrating 80% to 90% stenosis of a coronary artery too small to stent. (IDB)  Nuclear GXT, 2012: Normal study. EF 67%.  Unstable angina, 2018.  GXT MPS, 2018: moderate sized fixed mid anterior defect with no reversibility, LVEF 65%, abnormal baseline EKG with ST depression and downsloping noted during recovery; symptoms of chest tightness with exercise.  NSVT on o, 10/2018.  [facility], 2018: Normal LV gram, 60% ramus stenosis with acceptable FFR and deferral of intervention; severe proximal to midsegment disease in dominant RCA treated with OCT guided stenting under the Dye Vert OCT procotol.  [facility], 2022: 80% stenosis of the right PDA successfully stented with 2.0 x 18 mm ENRIKE and reduced to 0%. Residual nonobstructive disease involving multiple vessels with patent stents of the RCA. Normal left regular systolic function, estimated EF 60%.  Hypertension.  Hyperlipidemia.  Obesity.  + Tobacco abuse " since age 17.  Gastric sleeve 12/16 in [place], [place] (-150 pounds).  Surgical history:  Gastric sleeve.  Partial hysterectomy.  R knee meniscus repair.    History of Present Illness  Patient presents today for a follow-up with a history of CAD, inappropriate sinus tachycardia, and cardiac risk factors.  The patient's primary care provider wanted her to be seen sooner than expected.  She has had 2-3 episodes of what she thinks may be SVT over the past month.  1 episode occurred while she was walking through [place].  She will feel her heart race then get lightheaded and break out in an extreme sweat.  Her symptoms are similar to the same symptoms she had prior to PCI.  Her last heart cath in May 2022 she received stenting to the right PDA.  She does have a long history of atrial tachycardia/SVT for which she underwent several ablations with Dr. Lehman.  At her last visit she had reported palpitations and wore a 48-hour monitor that showed several short brief runs of SVT but nothing sustained.  She is on diltiazem as well as metoprolol.  She has never taken an extra dose of metoprolol when her episodes occur.  She reports her episodes can last from 5 minutes to an hour.  When her episodes occur she will experience some tightness in her chest and will also feel short of breath.    Allergies   Allergen Reactions    Aspirin Hives and Angioedema    Z-Wolf [Azithromycin] Rash         Current Outpatient Medications:     albuterol (PROVENTIL) (2.5 MG/3ML) 0.083% nebulizer solution, Take 2.5 mg by nebulization As Needed for Wheezing., Disp: , Rfl:     budesonide (PULMICORT) 0.25 MG/2ML nebulizer solution, Take 2 mL by nebulization As Needed., Disp: , Rfl:     budesonide-formoterol (SYMBICORT) 160-4.5 MCG/ACT inhaler, Inhale 2 puffs 2 (Two) Times a Day., Disp: , Rfl:     clonazePAM (KlonoPIN) 1 MG tablet, Take 1 tablet by mouth 3 (Three) Times a Day As Needed for Seizures., Disp: , Rfl:     clopidogrel (PLAVIX) 75 MG tablet,  Take 1 tablet by mouth Daily., Disp: 90 tablet, Rfl: 2    Cyanocobalamin 1000 MCG/ML kit, Inject 1 dose as directed Every 30 (Thirty) Days., Disp: , Rfl:     diltiaZEM (CARDIZEM) 90 MG tablet, Take 1 tablet by mouth 4 (Four) Times a Day. (Patient taking differently: Take 1 tablet by mouth 4 (Four) Times a Day. 2 tablets 4 times daily), Disp: , Rfl:     diphenhydrAMINE (BENADRYL) 50 MG capsule, Take 1 capsule by mouth 2 (Two) Times a Day As Needed for Itching., Disp: , Rfl:     escitalopram (LEXAPRO) 10 MG tablet, Take 1 tablet by mouth Daily. 0.5 tablet daily, Disp: , Rfl:     ezetimibe (ZETIA) 10 MG tablet, TAKE ONE TABLET BY MOUTH EVERY DAY, Disp: 30 tablet, Rfl: 1    folic acid (FOLVITE) 1 MG tablet, Take 1 tablet by mouth Daily., Disp: , Rfl:     furosemide (LASIX) 40 MG tablet, Take 1 tablet by mouth Daily., Disp: , Rfl:     HYDROcodone-acetaminophen (NORCO) 7.5-325 MG per tablet, Take 1 tablet by mouth As Needed., Disp: , Rfl:     lisinopril-hydrochlorothiazide (PRINZIDE,ZESTORETIC) 10-12.5 MG per tablet, Take 1 tablet by mouth Daily., Disp: , Rfl:     meloxicam (MOBIC) 15 MG tablet, Take 1 tablet by mouth As Needed., Disp: , Rfl:     metoprolol tartrate (LOPRESSOR) 50 MG tablet, Take 1 tablet by mouth 2 (Two) Times a Day., Disp: , Rfl:     Mounjaro 2.5 MG/0.5ML solution pen-injector, 1 (One) Time Per Week., Disp: , Rfl:     Multiple Vitamin (MULTI-VITAMIN DAILY PO), Take 1 tablet by mouth 2 (Two) Times a Day., Disp: , Rfl:     nitroglycerin (NITROSTAT) 0.4 MG SL tablet, Place 1 tablet under the tongue As Needed for Chest Pain. Take no more than 3 doses in 15 minutes., Disp: , Rfl:     pantoprazole (PROTONIX) 40 MG EC tablet, Take 1 tablet by mouth 2 (Two) Times a Day., Disp: , Rfl:     potassium chloride (K-DUR,KLOR-CON) 20 MEQ CR tablet, Take 1 tablet by mouth 2 (Two) Times a Day., Disp: , Rfl: 3    rosuvastatin (CRESTOR) 40 MG tablet, Take 1 tablet by mouth Daily., Disp: , Rfl:     VITAMIN D PO, Take 50,000  "Units by mouth 1 (One) Time Per Week., Disp: , Rfl:     The following portions of the patient's history were reviewed and updated as appropriate: allergies, current medications, past family history, past medical history, past social history, past surgical history and problem list.    Review of Systems   Cardiovascular:  Positive for chest pain, irregular heartbeat, near-syncope and palpitations.   Respiratory:  Positive for shortness of breath.      Review of Systems   14 point ROS negative except for that listed in the HPI.         Objective:     /52 (BP Location: Right arm, Patient Position: Sitting, Cuff Size: Adult)   Pulse 80   Ht 162.6 cm (64\")   Wt 61.8 kg (136 lb 3.2 oz)   LMP  (LMP Unknown)   SpO2 98%   BMI 23.38 kg/m²      Physical Exam  Constitutional: Patient appears well-developed and well-nourished.   HENT: HEENT exam unremarkable.   Neck: Neck supple. No JVD present. No carotid bruits.   Cardiovascular: Normal rate, regular rhythm and normal heart sounds. No murmur heard.   2+ symmetric pulses.   Pulmonary/Chest: Breath sounds normal. Does not exhibit tenderness.   Abdominal: Abdomen benign.   Musculoskeletal: Does not exhibit edema.   Neurological: Neurological exam unremarkable.   Vitals reviewed.    Data Review:   Lab date: 04/23/2024  FLP: , TG 91, HDL 45.6,   CMP: Glu 113, BUN 20, Creat 0.61, eGFR >60, Na 137, K 4.0, Cl 108, CO2 28.0, Ca 9.5, Alk Phos 59, AST 12, ALT 8  CBC: WBC 7.50, RBC 4.62, HGB 14.2, HCT 41.7, MCV 90.3, MCH 30.7,   HbA1c: 5.1         Procedures       Advance Care Planning   ACP discussion was held with the patient during this visit. Patient does not have an advance directive, information provided.           Assessment:      Diagnosis Plan   1. Coronary artery disease involving native coronary artery of native heart without angina pectoris  Stress Test With Myocardial Perfusion (1 Day)  Obtain myocardial perfusion study to rule out progression " of CAD  Continue Plavix 75 mg daily      2. Inappropriate sinus tachycardia  Patient to use extra dose of metoprolol for sustained palpitations.  Continue current dose of diltiazem and metoprolol tartrate      3. Essential hypertension  Currently controlled        4. Dyslipidemia  Continue Zetia and Crestor        Plan:   Obtain myocardial perfusion study to rule out progressive CAD for her increased palpitations, shortness of breath and chest discomfort  Patient can use extra dose metoprolol for any sustained palpitations.  If palpitations continue to progress or worsen and stress testing within normal limits can refer back to EP.   Continue current medications.   FU in 6 MO, sooner as needed.        WOOD Hogue      Please note that portions of this note may have been completed with a voice recognition program. Efforts were made to edit the dictations, but occasionally words are mistranscribed.

## 2025-04-04 ENCOUNTER — OFFICE VISIT (OUTPATIENT)
Dept: CARDIOLOGY | Facility: CLINIC | Age: 50
End: 2025-04-04
Payer: MEDICARE

## 2025-04-04 VITALS
OXYGEN SATURATION: 98 % | BODY MASS INDEX: 23.25 KG/M2 | WEIGHT: 136.2 LBS | DIASTOLIC BLOOD PRESSURE: 52 MMHG | SYSTOLIC BLOOD PRESSURE: 116 MMHG | HEIGHT: 64 IN | HEART RATE: 80 BPM

## 2025-04-04 DIAGNOSIS — I10 ESSENTIAL HYPERTENSION: ICD-10-CM

## 2025-04-04 DIAGNOSIS — I47.11 INAPPROPRIATE SINUS TACHYCARDIA: ICD-10-CM

## 2025-04-04 DIAGNOSIS — I25.10 CORONARY ARTERY DISEASE INVOLVING NATIVE CORONARY ARTERY OF NATIVE HEART WITHOUT ANGINA PECTORIS: Primary | ICD-10-CM

## 2025-04-04 DIAGNOSIS — E78.5 DYSLIPIDEMIA: ICD-10-CM

## 2025-04-04 PROBLEM — I20.9 ANGINA PECTORIS: Status: RESOLVED | Noted: 2018-11-09 | Resolved: 2025-04-04

## 2025-04-04 PROBLEM — R07.89 OTHER CHEST PAIN: Status: RESOLVED | Noted: 2018-10-02 | Resolved: 2025-04-04

## 2025-05-01 ENCOUNTER — HOSPITAL ENCOUNTER (OUTPATIENT)
Dept: CARDIOLOGY | Facility: HOSPITAL | Age: 50
Discharge: HOME | End: 2025-05-01
Payer: MEDICARE

## 2025-05-01 VITALS — HEIGHT: 64 IN | BODY MASS INDEX: 23.26 KG/M2 | WEIGHT: 136.24 LBS

## 2025-05-01 DIAGNOSIS — I25.10 CORONARY ARTERY DISEASE INVOLVING NATIVE CORONARY ARTERY OF NATIVE HEART WITHOUT ANGINA PECTORIS: ICD-10-CM

## 2025-05-01 LAB
BH CV REST NUCLEAR ISOTOPE DOSE: 9.9 MCI
BH CV STRESS BP STAGE 1: NORMAL
BH CV STRESS BP STAGE 2: NORMAL
BH CV STRESS BP STAGE 3: NORMAL
BH CV STRESS DURATION MIN STAGE 1: 3
BH CV STRESS DURATION MIN STAGE 2: 3
BH CV STRESS DURATION MIN STAGE 3: 3
BH CV STRESS DURATION MIN STAGE 4: 2
BH CV STRESS DURATION SEC STAGE 1: 0
BH CV STRESS DURATION SEC STAGE 2: 0
BH CV STRESS DURATION SEC STAGE 3: 0
BH CV STRESS DURATION SEC STAGE 4: 12
BH CV STRESS GRADE STAGE 1: 10
BH CV STRESS GRADE STAGE 2: 12
BH CV STRESS GRADE STAGE 3: 14
BH CV STRESS GRADE STAGE 4: 16
BH CV STRESS HR STAGE 1: 91
BH CV STRESS HR STAGE 2: 102
BH CV STRESS HR STAGE 3: 117
BH CV STRESS HR STAGE 4: 146
BH CV STRESS METS STAGE 1: 5
BH CV STRESS METS STAGE 2: 7.5
BH CV STRESS METS STAGE 3: 10
BH CV STRESS METS STAGE 4: 13.5
BH CV STRESS NUCLEAR ISOTOPE DOSE: 32.9 MCI
BH CV STRESS O2 STAGE 1: 99
BH CV STRESS O2 STAGE 2: 100
BH CV STRESS O2 STAGE 3: 98
BH CV STRESS O2 STAGE 4: 99
BH CV STRESS PROTOCOL 1: NORMAL
BH CV STRESS RECOVERY BP: NORMAL MMHG
BH CV STRESS RECOVERY HR: 80 BPM
BH CV STRESS RECOVERY O2: 99 %
BH CV STRESS SPEED STAGE 1: 1.7
BH CV STRESS SPEED STAGE 2: 2.5
BH CV STRESS SPEED STAGE 3: 3.4
BH CV STRESS SPEED STAGE 4: 4.2
BH CV STRESS STAGE 1: 1
BH CV STRESS STAGE 2: 2
BH CV STRESS STAGE 3: 3
BH CV STRESS STAGE 4: 4
MAXIMAL PREDICTED HEART RATE: 172 BPM
PERCENT MAX PREDICTED HR: 84.88 %
SPECT HRT GATED+EF W RNC IV: 72 %
STRESS BASELINE BP: NORMAL MMHG
STRESS BASELINE HR: 51 BPM
STRESS O2 SAT REST: 99 %
STRESS PERCENT HR: 100 %
STRESS POST ESTIMATED WORKLOAD: 11.8 METS
STRESS POST EXERCISE DUR MIN: 11 MIN
STRESS POST EXERCISE DUR SEC: 12 SEC
STRESS POST O2 SAT PEAK: 100 %
STRESS POST PEAK BP: NORMAL MMHG
STRESS POST PEAK HR: 146 BPM
STRESS TARGET HR: 146 BPM

## 2025-05-01 PROCEDURE — 34310000005 TECHNETIUM SESTAMIBI: Performed by: NURSE PRACTITIONER

## 2025-05-01 PROCEDURE — 78452 HT MUSCLE IMAGE SPECT MULT: CPT

## 2025-05-01 PROCEDURE — 93017 CV STRESS TEST TRACING ONLY: CPT

## 2025-05-01 PROCEDURE — A9500 TC99M SESTAMIBI: HCPCS | Performed by: NURSE PRACTITIONER

## 2025-05-01 RX ADMIN — TECHNETIUM TC 99M SESTAMIBI 1 DOSE: 1 INJECTION INTRAVENOUS at 10:30

## 2025-05-01 RX ADMIN — TECHNETIUM TC 99M SESTAMIBI 1 DOSE: 1 INJECTION INTRAVENOUS at 08:22

## (undated) DEVICE — MINI TREK CORONARY DILATATION CATHETER 2.0 MM X 12 MM / RAPID-EXCHANGE: Brand: MINI TREK

## (undated) DEVICE — GUIDE CATHETER: Brand: MACH1™

## (undated) DEVICE — CATH DIAG EXPO M/ PK 5F FL4/FR4 PIG

## (undated) DEVICE — GW LUGE .014 182 CM

## (undated) DEVICE — MODEL AT P65, P/N 701554-001KIT CONTENTS: HAND CONTROLLER, 3-WAY HIGH-PRESSURE STOPCOCK WITH ROTATING END AND PREMIUM HIGH-PRESSURE TUBING: Brand: ANGIOTOUCH® KIT

## (undated) DEVICE — GW FIX CORE J .063

## (undated) DEVICE — CATH DIAG EXPO .045 FL3  5F 100CM

## (undated) DEVICE — KT MANIFOLD CATHLAB CUST

## (undated) DEVICE — INTRO SHEATH ART/FEM ENGAGE .038 6F12CM

## (undated) DEVICE — GW J TP FIX CORE .035 150

## (undated) DEVICE — GW PRESSUREWIRE AERIS W/ AGILE TP 175CM

## (undated) DEVICE — NC TREK CORONARY DILATATION CATHETER 3.5 MM X 20 MM / RAPID-EXCHANGE: Brand: NC TREK

## (undated) DEVICE — ANGIO-SEAL EVOLUTION VASCULAR CLOSURE DEVICE: Brand: ANGIO-SEAL

## (undated) DEVICE — MODEL BT2000 P/N 700287-012KIT CONTENTS: MANIFOLD WITH SALINE AND CONTRAST PORTS, SALINE TUBING WITH SPIKE AND HAND SYRINGE, TRANSDUCER: Brand: BT2000 AUTOMATED MANIFOLD KIT

## (undated) DEVICE — MINI TREK CORONARY DILATATION CATHETER 2.0 MM X 15 MM / RAPID-EXCHANGE: Brand: MINI TREK

## (undated) DEVICE — CATH DIAG EXPO M/ PK 6FR FL4/FR4 PIG 3PK

## (undated) DEVICE — CATH IMG DRAGONFLY OPTIS 2.7F 135CM

## (undated) DEVICE — PK CATH CARD 10

## (undated) DEVICE — GW PERIPH GUIDERIGHT STD/EXCHNG/J/TIP SS 0.035IN 5X260CM

## (undated) DEVICE — DEV INFL MONARCH 25W

## (undated) DEVICE — INTRO SHEATH PRELUDE IDEAL SPRNG COIL 021 6F 23X80CM

## (undated) DEVICE — DEV COMP RAD PRELUDESYNC 24CM